# Patient Record
Sex: FEMALE | Race: WHITE | NOT HISPANIC OR LATINO | URBAN - METROPOLITAN AREA
[De-identification: names, ages, dates, MRNs, and addresses within clinical notes are randomized per-mention and may not be internally consistent; named-entity substitution may affect disease eponyms.]

---

## 2018-06-12 ENCOUNTER — INPATIENT (INPATIENT)
Facility: HOSPITAL | Age: 23
LOS: 2 days | Discharge: HOME | End: 2018-06-15
Attending: PSYCHIATRY & NEUROLOGY | Admitting: PSYCHIATRY & NEUROLOGY
Payer: COMMERCIAL

## 2018-06-12 VITALS — OXYGEN SATURATION: 98 % | TEMPERATURE: 98 F | RESPIRATION RATE: 20 BRPM | HEIGHT: 67 IN | WEIGHT: 192.02 LBS

## 2018-06-12 DIAGNOSIS — F31.60 BIPOLAR DISORDER, CURRENT EPISODE MIXED, UNSPECIFIED: ICD-10-CM

## 2018-06-12 PROCEDURE — 90792 PSYCH DIAG EVAL W/MED SRVCS: CPT

## 2018-06-12 RX ORDER — CLONAZEPAM 1 MG
0.5 TABLET ORAL
Qty: 0 | Refills: 0 | Status: DISCONTINUED | OUTPATIENT
Start: 2018-06-12 | End: 2018-06-15

## 2018-06-12 RX ORDER — LITHIUM CARBONATE 300 MG/1
300 TABLET, EXTENDED RELEASE ORAL
Qty: 0 | Refills: 0 | Status: DISCONTINUED | OUTPATIENT
Start: 2018-06-12 | End: 2018-06-13

## 2018-06-12 RX ORDER — HALOPERIDOL DECANOATE 100 MG/ML
5 INJECTION INTRAMUSCULAR ONCE
Qty: 0 | Refills: 0 | Status: COMPLETED | OUTPATIENT
Start: 2018-06-12 | End: 2018-06-12

## 2018-06-12 RX ORDER — RISPERIDONE 4 MG/1
3 TABLET ORAL AT BEDTIME
Qty: 0 | Refills: 0 | Status: DISCONTINUED | OUTPATIENT
Start: 2018-06-12 | End: 2018-06-13

## 2018-06-12 RX ORDER — LEVOTHYROXINE SODIUM 125 MCG
50 TABLET ORAL DAILY
Qty: 0 | Refills: 0 | Status: DISCONTINUED | OUTPATIENT
Start: 2018-06-12 | End: 2018-06-15

## 2018-06-12 RX ORDER — DIVALPROEX SODIUM 500 MG/1
1000 TABLET, DELAYED RELEASE ORAL AT BEDTIME
Qty: 0 | Refills: 0 | Status: DISCONTINUED | OUTPATIENT
Start: 2018-06-12 | End: 2018-06-13

## 2018-06-12 RX ADMIN — Medication 2 MILLIGRAM(S): at 22:02

## 2018-06-12 RX ADMIN — LITHIUM CARBONATE 300 MILLIGRAM(S): 300 TABLET, EXTENDED RELEASE ORAL at 21:15

## 2018-06-12 RX ADMIN — HALOPERIDOL DECANOATE 5 MILLIGRAM(S): 100 INJECTION INTRAMUSCULAR at 22:02

## 2018-06-12 RX ADMIN — DIVALPROEX SODIUM 1000 MILLIGRAM(S): 500 TABLET, DELAYED RELEASE ORAL at 21:15

## 2018-06-12 RX ADMIN — Medication 0.5 MILLIGRAM(S): at 21:15

## 2018-06-12 RX ADMIN — RISPERIDONE 3 MILLIGRAM(S): 4 TABLET ORAL at 21:15

## 2018-06-12 NOTE — CHART NOTE - NSCHARTNOTEFT_GEN_A_CORE
Screening Medical Evaluation  Patient Admitted from: [facility] admitting diagnosis: Bipolar affective disorder, current episode mixed    PAST MEDICAL & SURGICAL HISTORY:  PCOS (polycystic ovarian syndrome)  ADHD (attention deficit hyperactivity disorder)  Bipolar 1 disorder  No significant past surgical history        Allergies    No Known Allergies    Intolerances        Social History:     FAMILY HISTORY:  No pertinent family history in first degree relatives      MEDICATIONS  (STANDING):  clonazePAM Tablet 0.5 milliGRAM(s) Oral two times a day  diVALproex ER 1000 milliGRAM(s) Oral at bedtime  levothyroxine 50 MICROGram(s) Oral daily  lithium SR (LITHOBID) 300 milliGRAM(s) Oral two times a day  risperiDONE   Tablet 3 milliGRAM(s) Oral at bedtime    MEDICATIONS  (PRN):  LORazepam   Injectable 2 milliGRAM(s) IntraMuscular every 6 hours PRN Agitation  LORazepam   Injectable 2 milliGRAM(s) IntraMuscular every 6 hours PRN Agitation      Vital Signs Last 24 Hrs  T(C): 36.8 (12 Jun 2018 18:03), Max: 36.8 (12 Jun 2018 18:03)  T(F): 98.3 (12 Jun 2018 18:03), Max: 98.3 (12 Jun 2018 18:03)  HR: 103 (12 Jun 2018 18:03)  BP: 134/80 (12 Jun 2018 18:03)  RR: 20 (12 Jun 2018 18:03) (20 - 20)  SpO2: 98% (12 Jun 2018 18:03) (98% - 98%)  CAPILLARY BLOOD GLUCOSE            PHYSICAL EXAM:  GENERAL: NAD, well-developed  HEAD:  Atraumatic, Normocephalic  EYES: EOMI, PERRLA, conjunctiva and sclera clear  NECK: Supple.  CHEST/LUNG: Clear to auscultation bilaterally; No wheezes noted.  HEART: Regular rate and rhythm; +s1 and +s2; No murmurs, rubs, or gallops  ABDOMEN: Soft, Nontender, Nondistended; Normoactive Bowel sounds present  EXTREMITIES:  2+ Peripheral Pulses, No cyanosis, or edema  PSYCH: AAOx3  NEUROLOGY: non-focal  SKIN: No rashes or lesions    LABS:                    RADIOLOGY & ADDITIONAL TESTS:    Assessment and Plan:  23 year old female presenting today from [facility] to [facility] with admitting diagnosis of Bipolar affective disorder, current episode mixed with PMH of PCOS, and hypothyroidism. Pt admitted due to ketamine induced psychosis with what is described as rapid cycling as per chart. Denies any medical concerns at this time. Denies any fever, chills, headache, SOB, chest pain, abdominal pain, N/V/D/C.  1)	Bipolar affective disorder, current episode mixed: follow care plan as per primary psych team.  2)	Hypthyroidism: Continue Synthroid 50 mcg oral daily.

## 2018-06-13 LAB
ALBUMIN SERPL ELPH-MCNC: 3.9 G/DL — SIGNIFICANT CHANGE UP (ref 3.3–5)
ALP SERPL-CCNC: 99 U/L — SIGNIFICANT CHANGE UP (ref 40–120)
ALT FLD-CCNC: 31 U/L — SIGNIFICANT CHANGE UP (ref 4–33)
AMPHET UR-MCNC: NEGATIVE — SIGNIFICANT CHANGE UP
APPEARANCE UR: CLEAR — SIGNIFICANT CHANGE UP
AST SERPL-CCNC: 25 U/L — SIGNIFICANT CHANGE UP (ref 4–32)
BACTERIA # UR AUTO: SIGNIFICANT CHANGE UP
BARBITURATES UR SCN-MCNC: NEGATIVE — SIGNIFICANT CHANGE UP
BASOPHILS # BLD AUTO: 0.03 K/UL — SIGNIFICANT CHANGE UP (ref 0–0.2)
BASOPHILS NFR BLD AUTO: 0.5 % — SIGNIFICANT CHANGE UP (ref 0–2)
BENZODIAZ UR-MCNC: NEGATIVE — SIGNIFICANT CHANGE UP
BILIRUB SERPL-MCNC: 0.2 MG/DL — SIGNIFICANT CHANGE UP (ref 0.2–1.2)
BILIRUB UR-MCNC: NEGATIVE — SIGNIFICANT CHANGE UP
BLOOD UR QL VISUAL: NEGATIVE — SIGNIFICANT CHANGE UP
BUN SERPL-MCNC: 13 MG/DL — SIGNIFICANT CHANGE UP (ref 7–23)
CALCIUM SERPL-MCNC: 9.2 MG/DL — SIGNIFICANT CHANGE UP (ref 8.4–10.5)
CANNABINOIDS UR-MCNC: NEGATIVE — SIGNIFICANT CHANGE UP
CHLORIDE SERPL-SCNC: 103 MMOL/L — SIGNIFICANT CHANGE UP (ref 98–107)
CHOLEST SERPL-MCNC: 199 MG/DL — SIGNIFICANT CHANGE UP (ref 120–199)
CO2 SERPL-SCNC: 25 MMOL/L — SIGNIFICANT CHANGE UP (ref 22–31)
COCAINE METAB.OTHER UR-MCNC: NEGATIVE — SIGNIFICANT CHANGE UP
COLOR SPEC: SIGNIFICANT CHANGE UP
CREAT SERPL-MCNC: 0.97 MG/DL — SIGNIFICANT CHANGE UP (ref 0.5–1.3)
EOSINOPHIL # BLD AUTO: 0.18 K/UL — SIGNIFICANT CHANGE UP (ref 0–0.5)
EOSINOPHIL NFR BLD AUTO: 2.9 % — SIGNIFICANT CHANGE UP (ref 0–6)
GLUCOSE SERPL-MCNC: 82 MG/DL — SIGNIFICANT CHANGE UP (ref 70–99)
GLUCOSE UR-MCNC: NEGATIVE — SIGNIFICANT CHANGE UP
HBA1C BLD-MCNC: 4.8 % — SIGNIFICANT CHANGE UP (ref 4–5.6)
HCG UR-SCNC: NEGATIVE — SIGNIFICANT CHANGE UP
HCT VFR BLD CALC: 38.5 % — SIGNIFICANT CHANGE UP (ref 34.5–45)
HDLC SERPL-MCNC: 50 MG/DL — SIGNIFICANT CHANGE UP (ref 45–65)
HGB BLD-MCNC: 12.4 G/DL — SIGNIFICANT CHANGE UP (ref 11.5–15.5)
HYALINE CASTS # UR AUTO: SIGNIFICANT CHANGE UP (ref 0–?)
IMM GRANULOCYTES # BLD AUTO: 0.03 # — SIGNIFICANT CHANGE UP
IMM GRANULOCYTES NFR BLD AUTO: 0.5 % — SIGNIFICANT CHANGE UP (ref 0–1.5)
KETONES UR-MCNC: NEGATIVE — SIGNIFICANT CHANGE UP
LEUKOCYTE ESTERASE UR-ACNC: NEGATIVE — SIGNIFICANT CHANGE UP
LIPID PNL WITH DIRECT LDL SERPL: 122 MG/DL — SIGNIFICANT CHANGE UP
LITHIUM SERPL-MCNC: 0.81 MMOL/L — SIGNIFICANT CHANGE UP (ref 0.6–1.2)
LYMPHOCYTES # BLD AUTO: 2.26 K/UL — SIGNIFICANT CHANGE UP (ref 1–3.3)
LYMPHOCYTES # BLD AUTO: 36.9 % — SIGNIFICANT CHANGE UP (ref 13–44)
MCHC RBC-ENTMCNC: 30.2 PG — SIGNIFICANT CHANGE UP (ref 27–34)
MCHC RBC-ENTMCNC: 32.2 % — SIGNIFICANT CHANGE UP (ref 32–36)
MCV RBC AUTO: 93.7 FL — SIGNIFICANT CHANGE UP (ref 80–100)
METHADONE UR-MCNC: NEGATIVE — SIGNIFICANT CHANGE UP
MONOCYTES # BLD AUTO: 0.38 K/UL — SIGNIFICANT CHANGE UP (ref 0–0.9)
MONOCYTES NFR BLD AUTO: 6.2 % — SIGNIFICANT CHANGE UP (ref 2–14)
NEUTROPHILS # BLD AUTO: 3.24 K/UL — SIGNIFICANT CHANGE UP (ref 1.8–7.4)
NEUTROPHILS NFR BLD AUTO: 53 % — SIGNIFICANT CHANGE UP (ref 43–77)
NITRITE UR-MCNC: NEGATIVE — SIGNIFICANT CHANGE UP
NRBC # FLD: 0 — SIGNIFICANT CHANGE UP
OPIATES UR-MCNC: NEGATIVE — SIGNIFICANT CHANGE UP
OXYCODONE UR-MCNC: NEGATIVE — SIGNIFICANT CHANGE UP
PCP UR-MCNC: NEGATIVE — SIGNIFICANT CHANGE UP
PH UR: 7 — SIGNIFICANT CHANGE UP (ref 4.6–8)
PLATELET # BLD AUTO: 228 K/UL — SIGNIFICANT CHANGE UP (ref 150–400)
PMV BLD: 10.8 FL — SIGNIFICANT CHANGE UP (ref 7–13)
POTASSIUM SERPL-MCNC: 5.1 MMOL/L — SIGNIFICANT CHANGE UP (ref 3.5–5.3)
POTASSIUM SERPL-SCNC: 5.1 MMOL/L — SIGNIFICANT CHANGE UP (ref 3.5–5.3)
PROT SERPL-MCNC: 6.3 G/DL — SIGNIFICANT CHANGE UP (ref 6–8.3)
PROT UR-MCNC: NEGATIVE MG/DL — SIGNIFICANT CHANGE UP
RBC # BLD: 4.11 M/UL — SIGNIFICANT CHANGE UP (ref 3.8–5.2)
RBC # FLD: 11.5 % — SIGNIFICANT CHANGE UP (ref 10.3–14.5)
RBC CASTS # UR COMP ASSIST: SIGNIFICANT CHANGE UP (ref 0–?)
SODIUM SERPL-SCNC: 141 MMOL/L — SIGNIFICANT CHANGE UP (ref 135–145)
SP GR SPEC: 1 — LOW (ref 1–1.04)
SP GR UR: 1 — LOW (ref 1–1.03)
SQUAMOUS # UR AUTO: SIGNIFICANT CHANGE UP
TRIGL SERPL-MCNC: 213 MG/DL — HIGH (ref 10–149)
TSH SERPL-MCNC: 6.03 UIU/ML — HIGH (ref 0.27–4.2)
UROBILINOGEN FLD QL: NORMAL MG/DL — SIGNIFICANT CHANGE UP
VALPROATE SERPL-MCNC: 41.1 UG/ML — LOW (ref 50–100)
WBC # BLD: 6.12 K/UL — SIGNIFICANT CHANGE UP (ref 3.8–10.5)
WBC # FLD AUTO: 6.12 K/UL — SIGNIFICANT CHANGE UP (ref 3.8–10.5)
WBC UR QL: SIGNIFICANT CHANGE UP (ref 0–?)

## 2018-06-13 PROCEDURE — 99233 SBSQ HOSP IP/OBS HIGH 50: CPT

## 2018-06-13 RX ORDER — DIPHENHYDRAMINE HCL 50 MG
50 CAPSULE ORAL EVERY 4 HOURS
Qty: 0 | Refills: 0 | Status: DISCONTINUED | OUTPATIENT
Start: 2018-06-13 | End: 2018-06-15

## 2018-06-13 RX ORDER — HALOPERIDOL DECANOATE 100 MG/ML
5 INJECTION INTRAMUSCULAR EVERY 4 HOURS
Qty: 0 | Refills: 0 | Status: DISCONTINUED | OUTPATIENT
Start: 2018-06-13 | End: 2018-06-15

## 2018-06-13 RX ORDER — RISPERIDONE 4 MG/1
4 TABLET ORAL AT BEDTIME
Qty: 0 | Refills: 0 | Status: DISCONTINUED | OUTPATIENT
Start: 2018-06-13 | End: 2018-06-15

## 2018-06-13 RX ORDER — DIPHENHYDRAMINE HCL 50 MG
50 CAPSULE ORAL ONCE
Qty: 0 | Refills: 0 | Status: DISCONTINUED | OUTPATIENT
Start: 2018-06-13 | End: 2018-06-15

## 2018-06-13 RX ORDER — HALOPERIDOL DECANOATE 100 MG/ML
5 INJECTION INTRAMUSCULAR ONCE
Qty: 0 | Refills: 0 | Status: DISCONTINUED | OUTPATIENT
Start: 2018-06-13 | End: 2018-06-15

## 2018-06-13 RX ADMIN — LITHIUM CARBONATE 300 MILLIGRAM(S): 300 TABLET, EXTENDED RELEASE ORAL at 09:30

## 2018-06-13 RX ADMIN — Medication 0.5 MILLIGRAM(S): at 21:00

## 2018-06-13 RX ADMIN — HALOPERIDOL DECANOATE 5 MILLIGRAM(S): 100 INJECTION INTRAMUSCULAR at 16:39

## 2018-06-13 RX ADMIN — Medication 50 MICROGRAM(S): at 09:30

## 2018-06-13 RX ADMIN — RISPERIDONE 4 MILLIGRAM(S): 4 TABLET ORAL at 21:00

## 2018-06-13 RX ADMIN — Medication 2 MILLIGRAM(S): at 16:39

## 2018-06-13 RX ADMIN — Medication 0.5 MILLIGRAM(S): at 09:24

## 2018-06-13 RX ADMIN — Medication 50 MILLIGRAM(S): at 16:39

## 2018-06-14 PROCEDURE — 90853 GROUP PSYCHOTHERAPY: CPT

## 2018-06-14 PROCEDURE — 90834 PSYTX W PT 45 MINUTES: CPT | Mod: 59

## 2018-06-14 PROCEDURE — 99232 SBSQ HOSP IP/OBS MODERATE 35: CPT | Mod: 25

## 2018-06-14 RX ORDER — LITHIUM CARBONATE 300 MG/1
300 TABLET, EXTENDED RELEASE ORAL
Qty: 0 | Refills: 0 | Status: DISCONTINUED | OUTPATIENT
Start: 2018-06-14 | End: 2018-06-14

## 2018-06-14 RX ORDER — CLONAZEPAM 1 MG
0.5 TABLET ORAL
Qty: 0 | Refills: 0 | Status: CANCELLED | OUTPATIENT
Start: 2018-06-20 | End: 2018-06-15

## 2018-06-14 RX ORDER — DIVALPROEX SODIUM 500 MG/1
1000 TABLET, DELAYED RELEASE ORAL AT BEDTIME
Qty: 0 | Refills: 0 | Status: DISCONTINUED | OUTPATIENT
Start: 2018-06-14 | End: 2018-06-14

## 2018-06-14 RX ADMIN — Medication 2 MILLIGRAM(S): at 10:50

## 2018-06-14 RX ADMIN — LITHIUM CARBONATE 300 MILLIGRAM(S): 300 TABLET, EXTENDED RELEASE ORAL at 09:22

## 2018-06-14 RX ADMIN — Medication 0.5 MILLIGRAM(S): at 20:32

## 2018-06-14 RX ADMIN — Medication 2 MILLIGRAM(S): at 14:31

## 2018-06-14 RX ADMIN — Medication 0.5 MILLIGRAM(S): at 09:22

## 2018-06-14 RX ADMIN — Medication 50 MICROGRAM(S): at 09:22

## 2018-06-14 RX ADMIN — RISPERIDONE 4 MILLIGRAM(S): 4 TABLET ORAL at 20:32

## 2018-06-15 VITALS — DIASTOLIC BLOOD PRESSURE: 59 MMHG | HEART RATE: 90 BPM | SYSTOLIC BLOOD PRESSURE: 115 MMHG | TEMPERATURE: 98 F

## 2018-06-15 PROCEDURE — 90834 PSYTX W PT 45 MINUTES: CPT

## 2018-06-15 PROCEDURE — 99238 HOSP IP/OBS DSCHRG MGMT 30/<: CPT

## 2018-06-15 RX ADMIN — Medication 0.5 MILLIGRAM(S): at 09:04

## 2018-06-15 RX ADMIN — Medication 50 MICROGRAM(S): at 09:04

## 2018-06-20 ENCOUNTER — EMERGENCY (EMERGENCY)
Facility: HOSPITAL | Age: 23
LOS: 1 days | Discharge: HOME | End: 2018-06-20
Admitting: EMERGENCY MEDICINE
Payer: COMMERCIAL

## 2018-06-20 VITALS
TEMPERATURE: 98 F | SYSTOLIC BLOOD PRESSURE: 144 MMHG | RESPIRATION RATE: 18 BRPM | HEART RATE: 81 BPM | OXYGEN SATURATION: 100 % | DIASTOLIC BLOOD PRESSURE: 82 MMHG

## 2018-06-20 VITALS
HEART RATE: 98 BPM | SYSTOLIC BLOOD PRESSURE: 135 MMHG | OXYGEN SATURATION: 100 % | DIASTOLIC BLOOD PRESSURE: 90 MMHG | TEMPERATURE: 98 F | RESPIRATION RATE: 18 BRPM

## 2018-06-20 DIAGNOSIS — F43.24 ADJUSTMENT DISORDER WITH DISTURBANCE OF CONDUCT: ICD-10-CM

## 2018-06-20 DIAGNOSIS — F60.3 BORDERLINE PERSONALITY DISORDER: ICD-10-CM

## 2018-06-20 LAB
ALBUMIN SERPL ELPH-MCNC: 4.3 G/DL — SIGNIFICANT CHANGE UP (ref 3.3–5)
ALP SERPL-CCNC: 131 U/L — HIGH (ref 40–120)
ALT FLD-CCNC: 31 U/L — SIGNIFICANT CHANGE UP (ref 4–33)
AMPHET UR-MCNC: NEGATIVE — SIGNIFICANT CHANGE UP
APAP SERPL-MCNC: < 15 UG/ML — LOW (ref 15–25)
APPEARANCE UR: CLEAR — SIGNIFICANT CHANGE UP
AST SERPL-CCNC: 21 U/L — SIGNIFICANT CHANGE UP (ref 4–32)
BARBITURATES UR SCN-MCNC: NEGATIVE — SIGNIFICANT CHANGE UP
BASOPHILS # BLD AUTO: 0.02 K/UL — SIGNIFICANT CHANGE UP (ref 0–0.2)
BASOPHILS NFR BLD AUTO: 0.3 % — SIGNIFICANT CHANGE UP (ref 0–2)
BENZODIAZ UR-MCNC: NEGATIVE — SIGNIFICANT CHANGE UP
BILIRUB SERPL-MCNC: < 0.2 MG/DL — LOW (ref 0.2–1.2)
BILIRUB UR-MCNC: NEGATIVE — SIGNIFICANT CHANGE UP
BLOOD UR QL VISUAL: NEGATIVE — SIGNIFICANT CHANGE UP
BUN SERPL-MCNC: 14 MG/DL — SIGNIFICANT CHANGE UP (ref 7–23)
CALCIUM SERPL-MCNC: 9.6 MG/DL — SIGNIFICANT CHANGE UP (ref 8.4–10.5)
CANNABINOIDS UR-MCNC: NEGATIVE — SIGNIFICANT CHANGE UP
CHLORIDE SERPL-SCNC: 102 MMOL/L — SIGNIFICANT CHANGE UP (ref 98–107)
CO2 SERPL-SCNC: 28 MMOL/L — SIGNIFICANT CHANGE UP (ref 22–31)
COCAINE METAB.OTHER UR-MCNC: NEGATIVE — SIGNIFICANT CHANGE UP
COLOR SPEC: SIGNIFICANT CHANGE UP
CREAT SERPL-MCNC: 0.91 MG/DL — SIGNIFICANT CHANGE UP (ref 0.5–1.3)
EOSINOPHIL # BLD AUTO: 0.08 K/UL — SIGNIFICANT CHANGE UP (ref 0–0.5)
EOSINOPHIL NFR BLD AUTO: 1.2 % — SIGNIFICANT CHANGE UP (ref 0–6)
ETHANOL BLD-MCNC: < 10 MG/DL — SIGNIFICANT CHANGE UP
GLUCOSE SERPL-MCNC: 117 MG/DL — HIGH (ref 70–99)
GLUCOSE UR-MCNC: NEGATIVE — SIGNIFICANT CHANGE UP
HCT VFR BLD CALC: 38.1 % — SIGNIFICANT CHANGE UP (ref 34.5–45)
HGB BLD-MCNC: 13.1 G/DL — SIGNIFICANT CHANGE UP (ref 11.5–15.5)
IMM GRANULOCYTES # BLD AUTO: 0.03 # — SIGNIFICANT CHANGE UP
IMM GRANULOCYTES NFR BLD AUTO: 0.4 % — SIGNIFICANT CHANGE UP (ref 0–1.5)
KETONES UR-MCNC: NEGATIVE — SIGNIFICANT CHANGE UP
LEUKOCYTE ESTERASE UR-ACNC: NEGATIVE — SIGNIFICANT CHANGE UP
LYMPHOCYTES # BLD AUTO: 2.61 K/UL — SIGNIFICANT CHANGE UP (ref 1–3.3)
LYMPHOCYTES # BLD AUTO: 38.4 % — SIGNIFICANT CHANGE UP (ref 13–44)
MCHC RBC-ENTMCNC: 29.7 PG — SIGNIFICANT CHANGE UP (ref 27–34)
MCHC RBC-ENTMCNC: 34.4 % — SIGNIFICANT CHANGE UP (ref 32–36)
MCV RBC AUTO: 86.4 FL — SIGNIFICANT CHANGE UP (ref 80–100)
METHADONE UR-MCNC: NEGATIVE — SIGNIFICANT CHANGE UP
MONOCYTES # BLD AUTO: 0.39 K/UL — SIGNIFICANT CHANGE UP (ref 0–0.9)
MONOCYTES NFR BLD AUTO: 5.7 % — SIGNIFICANT CHANGE UP (ref 2–14)
MUCOUS THREADS # UR AUTO: SIGNIFICANT CHANGE UP
NEUTROPHILS # BLD AUTO: 3.66 K/UL — SIGNIFICANT CHANGE UP (ref 1.8–7.4)
NEUTROPHILS NFR BLD AUTO: 54 % — SIGNIFICANT CHANGE UP (ref 43–77)
NITRITE UR-MCNC: NEGATIVE — SIGNIFICANT CHANGE UP
NRBC # FLD: 0 — SIGNIFICANT CHANGE UP
OPIATES UR-MCNC: NEGATIVE — SIGNIFICANT CHANGE UP
OXYCODONE UR-MCNC: NEGATIVE — SIGNIFICANT CHANGE UP
PCP UR-MCNC: NEGATIVE — SIGNIFICANT CHANGE UP
PH UR: 6 — SIGNIFICANT CHANGE UP (ref 4.6–8)
PLATELET # BLD AUTO: 273 K/UL — SIGNIFICANT CHANGE UP (ref 150–400)
PMV BLD: 10.3 FL — SIGNIFICANT CHANGE UP (ref 7–13)
POTASSIUM SERPL-MCNC: 4 MMOL/L — SIGNIFICANT CHANGE UP (ref 3.5–5.3)
POTASSIUM SERPL-SCNC: 4 MMOL/L — SIGNIFICANT CHANGE UP (ref 3.5–5.3)
PROT SERPL-MCNC: 7.3 G/DL — SIGNIFICANT CHANGE UP (ref 6–8.3)
PROT UR-MCNC: NEGATIVE MG/DL — SIGNIFICANT CHANGE UP
RBC # BLD: 4.41 M/UL — SIGNIFICANT CHANGE UP (ref 3.8–5.2)
RBC # FLD: 11.3 % — SIGNIFICANT CHANGE UP (ref 10.3–14.5)
RBC CASTS # UR COMP ASSIST: SIGNIFICANT CHANGE UP (ref 0–?)
SALICYLATES SERPL-MCNC: < 5 MG/DL — LOW (ref 15–30)
SODIUM SERPL-SCNC: 140 MMOL/L — SIGNIFICANT CHANGE UP (ref 135–145)
SP GR SPEC: 1.01 — SIGNIFICANT CHANGE UP (ref 1–1.04)
SQUAMOUS # UR AUTO: SIGNIFICANT CHANGE UP
TSH SERPL-MCNC: 2.07 UIU/ML — SIGNIFICANT CHANGE UP (ref 0.27–4.2)
UROBILINOGEN FLD QL: NORMAL MG/DL — SIGNIFICANT CHANGE UP
WBC # BLD: 6.79 K/UL — SIGNIFICANT CHANGE UP (ref 3.8–10.5)
WBC # FLD AUTO: 6.79 K/UL — SIGNIFICANT CHANGE UP (ref 3.8–10.5)
WBC UR QL: SIGNIFICANT CHANGE UP (ref 0–?)

## 2018-06-20 PROCEDURE — 93010 ELECTROCARDIOGRAM REPORT: CPT

## 2018-06-20 PROCEDURE — 90792 PSYCH DIAG EVAL W/MED SRVCS: CPT

## 2018-06-20 PROCEDURE — 99285 EMERGENCY DEPT VISIT HI MDM: CPT | Mod: 25

## 2018-06-20 NOTE — ED BEHAVIORAL HEALTH ASSESSMENT NOTE - RISK ASSESSMENT
moderate- see above  Patient is in active mood episode, has hx of suicide attempts, long severe mental illness since age 6, family hx of completed suicide, impulsivity, issues with primary support, unemployed, dropped out of college Risk factors include recent inpt hospitalization, prior suicide attempts, impulsivity, interpersonal instability, and recent medication changes. Protective factors includes no current active SI/HI/I/P, positive outpatient therapeutic relationships, abstinence from substances, no access to guns, no legal problems, compliance with medications, future oriented (states she needs to be alive for her dog), able to engage in safety planning, and denies global insomnia. At this time pt is at low imminent risk of harm but at elevated chronic risk. Chronic risk factors cannot be modified by an acute inpt hospitalization and better addressed in an intensive and long term outpatient setting.

## 2018-06-20 NOTE — ED BEHAVIORAL HEALTH ASSESSMENT NOTE - AXIS IV
Problems with primary support/Housing problems Problem related to social environment/Housing problems/Problems with primary support

## 2018-06-20 NOTE — ED BEHAVIORAL HEALTH ASSESSMENT NOTE - ACTIVATING EVENTS/STRESSORS
Pending incarceration or homelessness Pending incarceration or homelessness/Recent change in treartment
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
## 2018-06-20 NOTE — ED BEHAVIORAL HEALTH ASSESSMENT NOTE - DESCRIPTION (FIRST USE, LAST USE, QUANTITY, FREQUENCY, DURATION)
Last use was a beer 2 weeks ago, but reports remote hx of abusing alcohol and Alprazolam when she was 20yo. Denies hx of withdrawal sx including withdrawal seizures. Reports remote hx of abusing alcohol and Alprazolam when she was 20yo. Denies hx of withdrawal sx including withdrawal seizures. Denies current alcohol or drug use. see above

## 2018-06-20 NOTE — ED BEHAVIORAL HEALTH ASSESSMENT NOTE - PRIMARY DX
Bipolar disorder, current episode depressed, severe, without psychotic features Adjustment disorder with disturbance of conduct Borderline personality disorder

## 2018-06-20 NOTE — ED ADULT NURSE NOTE - CHIEF COMPLAINT QUOTE
pt co feeling depressed and suicidal. pt was d/cd from [facility] this past Friday. Pt with bipolar 1 disorder. Pt states was suppose to have ECt but left [facility] before treatment .

## 2018-06-20 NOTE — ED BEHAVIORAL HEALTH ASSESSMENT NOTE - SAFETY PLAN DETAILS
Call 911 or return to the ED with worsening symptoms, SI/HI, urges for SIB, aggressive I/I/P, or any other imminent safety concerns.

## 2018-06-20 NOTE — ED BEHAVIORAL HEALTH ASSESSMENT NOTE - CURRENT MEDICATION
[id number] in the morning and 900mg at bedtime, Abilify 5mg, Risperidal 3mg, Levothyroxine 25mcg, Alprazolam 0.5mg TID PRON, birth control Risperdal 4mg QHS, Klonopin 0.5mg BID, synthroid 50 mcg, OCP
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
## 2018-06-20 NOTE — ED BEHAVIORAL HEALTH ASSESSMENT NOTE - SUICIDE PROTECTIVE FACTORS
Positive therapeutic relationships/Supportive social network or family Identifies reasons for living/Future oriented/Positive therapeutic relationships
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
## 2018-06-20 NOTE — ED PROVIDER NOTE - MEDICAL DECISION MAKING DETAILS
This is a 23 year old Female PMHX ADHD PCOS Bipolar came in today for eval r/t admission to [facility]. Patient reports discharge from [facility] on Friday and not having ECT therapy. Since discharge she is experiencing increased depression, suicidal thoughts  and states " I just want to die" Medical evaluation performed. There is no clinical evidence of intoxication or any acute medical problem requiring immediate intervention. Final disposition will be determined by psychiatrist.

## 2018-06-20 NOTE — ED ADULT TRIAGE NOTE - CHIEF COMPLAINT QUOTE
pt co feeling depressed and suicidal. pt was d/cd from [facility] this past Friday. Pt with bipolar 1 disorder. Pt states was suppose to have ECt but left [facility] before treatment .

## 2018-06-20 NOTE — ED BEHAVIORAL HEALTH ASSESSMENT NOTE - CASE SUMMARY
22 y/o female with documented history of Borderline Personality Disorder, presents today following an argument with her mother and being told she does not have a home to return to.   Based on history and exam, pt demonstrates prominent features of borderline pathology (affective instability, impulsivity, easily angered, chronic passive SI, chaotic interpersonal relationships). Pt is not manic or psychotic, and she does not meet criteria for a major depressive episode. Currently, pt adamantly denies thoughts of self-harm, and she denies suicidal ideation, intent, or plan. She denies homicidal or violent ideation, intent, or plan. She does not present an acute danger to self or others and does not meet criteria for involuntary psychiatric admission at this time. She declines voluntary psychiatric admission.

## 2018-06-20 NOTE — ED PROVIDER NOTE - PMH
ADHD (attention deficit hyperactivity disorder)    Bipolar 1 disorder    PCOS (polycystic ovarian syndrome)

## 2018-06-20 NOTE — ED ADULT NURSE NOTE - OBJECTIVE STATEMENT
Patient received in  c/o feeling depressed and suicidal, pt denies plan/intent. pt endorsed recent d/c from [facility] and new onset of depression, endorsed plan to be started on ECT. pt denies HI&AH. Denies ETOH and substance use. psych eval ongoing

## 2018-06-20 NOTE — ED PROVIDER NOTE - OBJECTIVE STATEMENT
This is a 23 year old Female came in today for eval r/t admission to [facility]. Patient reports discharge from [facility] on Friday and not having ECT therapy. Since discharge she is experiencing increased depression, suicidal thoughts  and states " I just want to die" Reports on Risperdal, levothyroxine Klonopin. This is a 23 year old Female PMHX ADHD PCOS Bipolar came in today for eval r/t admission to [facility]. Patient reports discharge from [facility] on Friday and not having ECT therapy. Since discharge she is experiencing increased depression, suicidal thoughts  and states " I just want to die" Reports on Risperdal, levothyroxine Klonopin. Patient requesting voluntary admission. Denies HI Denies AH/VH Denies ETOH/Illicit drugs

## 2018-06-20 NOTE — ED BEHAVIORAL HEALTH ASSESSMENT NOTE - OTHER PAST PSYCHIATRIC HISTORY (INCLUDE DETAILS REGARDING ONSET, COURSE OF ILLNESS, INPATIENT/OUTPATIENT TREATMENT)
She reported history of bipolar disorder (rapid cylcing), ADHD and depression with SI since 5yo, multiple psych hospitalizations (last one was a few years ago), two suicide attempts by OD on Lithium when she was 19yo and by suffocation at age 17, hx of cutting, and she is currently being followed by Dr. Baeza (psychiatrist for 6 years) and Laya Vyas (weekly therapist for the past few weeks). See HPI. Previously reported history of bipolar disorder (rapid cylcing), ADHD and depression with SI since 5yo, multiple psych hospitalizations (last one was a few years ago), two suicide attempts by OD on Lithium when she was 17yo and by suffocation at age 17, hx of cutting, and she is currently being followed by Dr. Baeza (psychiatrist for 6 years) and Laya Vyas (weekly therapist for the past few weeks).

## 2018-06-20 NOTE — ED BEHAVIORAL HEALTH ASSESSMENT NOTE - REFERRAL / APPOINTMENT DETAILS
Continue outpatient treatment. Patient is pre-registered for ECT in the system. Continue outpatient treatment. Patient is pre-registered for ECT in the system. Attempt made to contact Dr. Hoyos to reschedule appt however the office is closed and voicemail was received. Message was left.

## 2018-06-20 NOTE — ED PROVIDER NOTE - CARE PLAN
Principal Discharge DX:	Adjustment disorder with disturbance of conduct  Secondary Diagnosis:	Borderline personality disorder
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
## 2018-06-26 ENCOUNTER — OUTPATIENT (OUTPATIENT)
Dept: OUTPATIENT SERVICES | Facility: HOSPITAL | Age: 23
LOS: 1 days | Discharge: HOME | End: 2018-06-26
Payer: COMMERCIAL

## 2018-07-03 PROCEDURE — 90870 ELECTROCONVULSIVE THERAPY: CPT

## 2018-07-03 RX ORDER — MIDAZOLAM HYDROCHLORIDE 1 MG/ML
2 INJECTION, SOLUTION INTRAMUSCULAR; INTRAVENOUS ONCE
Qty: 0 | Refills: 0 | Status: DISCONTINUED | OUTPATIENT
Start: 2018-07-03 | End: 2018-07-03

## 2018-07-03 RX ADMIN — MIDAZOLAM HYDROCHLORIDE 2 MILLIGRAM(S): 1 INJECTION, SOLUTION INTRAMUSCULAR; INTRAVENOUS at 14:05

## 2018-07-05 PROCEDURE — 90870 ELECTROCONVULSIVE THERAPY: CPT

## 2018-07-05 RX ORDER — MIDAZOLAM HYDROCHLORIDE 1 MG/ML
2 INJECTION, SOLUTION INTRAMUSCULAR; INTRAVENOUS ONCE
Qty: 0 | Refills: 0 | Status: DISCONTINUED | OUTPATIENT
Start: 2018-07-05 | End: 2018-07-05

## 2018-07-05 RX ADMIN — MIDAZOLAM HYDROCHLORIDE 2 MILLIGRAM(S): 1 INJECTION, SOLUTION INTRAMUSCULAR; INTRAVENOUS at 15:25

## 2018-07-10 PROCEDURE — 90870 ELECTROCONVULSIVE THERAPY: CPT

## 2018-07-10 RX ORDER — MIDAZOLAM HYDROCHLORIDE 1 MG/ML
2 INJECTION, SOLUTION INTRAMUSCULAR; INTRAVENOUS ONCE
Qty: 0 | Refills: 0 | Status: DISCONTINUED | OUTPATIENT
Start: 2018-07-10 | End: 2018-07-10

## 2018-07-10 RX ADMIN — MIDAZOLAM HYDROCHLORIDE 2 MILLIGRAM(S): 1 INJECTION, SOLUTION INTRAMUSCULAR; INTRAVENOUS at 13:53

## 2018-07-11 DIAGNOSIS — F32.9 MAJOR DEPRESSIVE DISORDER, SINGLE EPISODE, UNSPECIFIED: ICD-10-CM

## 2018-07-13 PROCEDURE — 90870 ELECTROCONVULSIVE THERAPY: CPT

## 2018-07-13 RX ORDER — MIDAZOLAM HYDROCHLORIDE 1 MG/ML
2 INJECTION, SOLUTION INTRAMUSCULAR; INTRAVENOUS ONCE
Qty: 0 | Refills: 0 | Status: DISCONTINUED | OUTPATIENT
Start: 2018-07-13 | End: 2018-07-13

## 2018-07-13 RX ADMIN — MIDAZOLAM HYDROCHLORIDE 2 MILLIGRAM(S): 1 INJECTION, SOLUTION INTRAMUSCULAR; INTRAVENOUS at 16:13

## 2018-07-17 PROCEDURE — 90870 ELECTROCONVULSIVE THERAPY: CPT

## 2018-07-17 RX ORDER — MIDAZOLAM HYDROCHLORIDE 1 MG/ML
2 INJECTION, SOLUTION INTRAMUSCULAR; INTRAVENOUS ONCE
Qty: 0 | Refills: 0 | Status: DISCONTINUED | OUTPATIENT
Start: 2018-07-17 | End: 2018-07-17

## 2018-07-17 RX ADMIN — MIDAZOLAM HYDROCHLORIDE 2 MILLIGRAM(S): 1 INJECTION, SOLUTION INTRAMUSCULAR; INTRAVENOUS at 14:00

## 2018-07-20 PROCEDURE — 90870 ELECTROCONVULSIVE THERAPY: CPT

## 2018-07-20 RX ORDER — MIDAZOLAM HYDROCHLORIDE 1 MG/ML
2 INJECTION, SOLUTION INTRAMUSCULAR; INTRAVENOUS ONCE
Qty: 0 | Refills: 0 | Status: DISCONTINUED | OUTPATIENT
Start: 2018-07-20 | End: 2018-07-20

## 2018-07-20 RX ADMIN — MIDAZOLAM HYDROCHLORIDE 2 MILLIGRAM(S): 1 INJECTION, SOLUTION INTRAMUSCULAR; INTRAVENOUS at 10:00

## 2018-07-24 PROCEDURE — 90870 ELECTROCONVULSIVE THERAPY: CPT

## 2018-07-24 RX ORDER — MIDAZOLAM HYDROCHLORIDE 1 MG/ML
2 INJECTION, SOLUTION INTRAMUSCULAR; INTRAVENOUS ONCE
Qty: 0 | Refills: 0 | Status: DISCONTINUED | OUTPATIENT
Start: 2018-07-24 | End: 2018-07-24

## 2018-07-24 RX ADMIN — MIDAZOLAM HYDROCHLORIDE 2 MILLIGRAM(S): 1 INJECTION, SOLUTION INTRAMUSCULAR; INTRAVENOUS at 14:12

## 2018-07-26 PROCEDURE — 90870 ELECTROCONVULSIVE THERAPY: CPT

## 2018-07-26 RX ORDER — MIDAZOLAM HYDROCHLORIDE 1 MG/ML
2 INJECTION, SOLUTION INTRAMUSCULAR; INTRAVENOUS ONCE
Qty: 0 | Refills: 0 | Status: DISCONTINUED | OUTPATIENT
Start: 2018-07-26 | End: 2018-07-26

## 2018-07-26 RX ADMIN — MIDAZOLAM HYDROCHLORIDE 2 MILLIGRAM(S): 1 INJECTION, SOLUTION INTRAMUSCULAR; INTRAVENOUS at 10:38

## 2018-07-31 PROCEDURE — 90870 ELECTROCONVULSIVE THERAPY: CPT

## 2018-07-31 RX ORDER — MIDAZOLAM HYDROCHLORIDE 1 MG/ML
2 INJECTION, SOLUTION INTRAMUSCULAR; INTRAVENOUS ONCE
Qty: 0 | Refills: 0 | Status: DISCONTINUED | OUTPATIENT
Start: 2018-07-31 | End: 2018-07-31

## 2018-07-31 RX ADMIN — MIDAZOLAM HYDROCHLORIDE 2 MILLIGRAM(S): 1 INJECTION, SOLUTION INTRAMUSCULAR; INTRAVENOUS at 11:52

## 2018-08-03 PROCEDURE — 90870 ELECTROCONVULSIVE THERAPY: CPT

## 2018-08-03 RX ORDER — MIDAZOLAM HYDROCHLORIDE 1 MG/ML
2 INJECTION, SOLUTION INTRAMUSCULAR; INTRAVENOUS ONCE
Qty: 0 | Refills: 0 | Status: DISCONTINUED | OUTPATIENT
Start: 2018-08-03 | End: 2018-08-03

## 2018-08-03 RX ADMIN — MIDAZOLAM HYDROCHLORIDE 2 MILLIGRAM(S): 1 INJECTION, SOLUTION INTRAMUSCULAR; INTRAVENOUS at 16:14

## 2018-08-06 PROCEDURE — 90870 ELECTROCONVULSIVE THERAPY: CPT

## 2018-08-06 RX ORDER — MIDAZOLAM HYDROCHLORIDE 1 MG/ML
2 INJECTION, SOLUTION INTRAMUSCULAR; INTRAVENOUS ONCE
Qty: 0 | Refills: 0 | Status: DISCONTINUED | OUTPATIENT
Start: 2018-08-06 | End: 2018-08-06

## 2018-08-06 RX ADMIN — MIDAZOLAM HYDROCHLORIDE 2 MILLIGRAM(S): 1 INJECTION, SOLUTION INTRAMUSCULAR; INTRAVENOUS at 15:07

## 2018-08-09 PROCEDURE — 90870 ELECTROCONVULSIVE THERAPY: CPT

## 2018-08-09 RX ORDER — MIDAZOLAM HYDROCHLORIDE 1 MG/ML
2 INJECTION, SOLUTION INTRAMUSCULAR; INTRAVENOUS ONCE
Qty: 0 | Refills: 0 | Status: DISCONTINUED | OUTPATIENT
Start: 2018-08-09 | End: 2018-08-09

## 2018-08-09 RX ADMIN — MIDAZOLAM HYDROCHLORIDE 2 MILLIGRAM(S): 1 INJECTION, SOLUTION INTRAMUSCULAR; INTRAVENOUS at 16:00

## 2018-08-14 PROCEDURE — 90870 ELECTROCONVULSIVE THERAPY: CPT

## 2018-08-14 RX ORDER — MIDAZOLAM HYDROCHLORIDE 1 MG/ML
2 INJECTION, SOLUTION INTRAMUSCULAR; INTRAVENOUS ONCE
Qty: 0 | Refills: 0 | Status: DISCONTINUED | OUTPATIENT
Start: 2018-08-14 | End: 2018-08-14

## 2018-08-14 RX ADMIN — MIDAZOLAM HYDROCHLORIDE 2 MILLIGRAM(S): 1 INJECTION, SOLUTION INTRAMUSCULAR; INTRAVENOUS at 15:00

## 2018-08-21 PROCEDURE — 90870 ELECTROCONVULSIVE THERAPY: CPT

## 2018-08-21 RX ORDER — MIDAZOLAM HYDROCHLORIDE 1 MG/ML
2 INJECTION, SOLUTION INTRAMUSCULAR; INTRAVENOUS ONCE
Qty: 0 | Refills: 0 | Status: DISCONTINUED | OUTPATIENT
Start: 2018-08-21 | End: 2018-08-21

## 2018-08-21 RX ADMIN — MIDAZOLAM HYDROCHLORIDE 2 MILLIGRAM(S): 1 INJECTION, SOLUTION INTRAMUSCULAR; INTRAVENOUS at 16:41

## 2018-08-28 PROCEDURE — 90870 ELECTROCONVULSIVE THERAPY: CPT

## 2018-08-28 RX ORDER — MIDAZOLAM HYDROCHLORIDE 1 MG/ML
2 INJECTION, SOLUTION INTRAMUSCULAR; INTRAVENOUS ONCE
Qty: 0 | Refills: 0 | Status: DISCONTINUED | OUTPATIENT
Start: 2018-08-28 | End: 2018-08-28

## 2018-08-28 RX ADMIN — MIDAZOLAM HYDROCHLORIDE 2 MILLIGRAM(S): 1 INJECTION, SOLUTION INTRAMUSCULAR; INTRAVENOUS at 12:05

## 2018-09-07 PROCEDURE — 90870 ELECTROCONVULSIVE THERAPY: CPT

## 2018-09-07 RX ORDER — MIDAZOLAM HYDROCHLORIDE 1 MG/ML
2 INJECTION, SOLUTION INTRAMUSCULAR; INTRAVENOUS ONCE
Qty: 0 | Refills: 0 | Status: DISCONTINUED | OUTPATIENT
Start: 2018-09-07 | End: 2018-09-07

## 2018-09-07 RX ADMIN — MIDAZOLAM HYDROCHLORIDE 2 MILLIGRAM(S): 1 INJECTION, SOLUTION INTRAMUSCULAR; INTRAVENOUS at 14:08

## 2018-09-21 PROCEDURE — 90870 ELECTROCONVULSIVE THERAPY: CPT

## 2018-09-21 RX ORDER — MIDAZOLAM HYDROCHLORIDE 1 MG/ML
2 INJECTION, SOLUTION INTRAMUSCULAR; INTRAVENOUS ONCE
Qty: 0 | Refills: 0 | Status: DISCONTINUED | OUTPATIENT
Start: 2018-09-21 | End: 2018-09-21

## 2018-09-21 RX ADMIN — MIDAZOLAM HYDROCHLORIDE 2 MILLIGRAM(S): 1 INJECTION, SOLUTION INTRAMUSCULAR; INTRAVENOUS at 15:15

## 2018-10-05 PROCEDURE — 90870 ELECTROCONVULSIVE THERAPY: CPT

## 2018-10-05 RX ORDER — MIDAZOLAM HYDROCHLORIDE 1 MG/ML
2 INJECTION, SOLUTION INTRAMUSCULAR; INTRAVENOUS ONCE
Qty: 0 | Refills: 0 | Status: DISCONTINUED | OUTPATIENT
Start: 2018-10-05 | End: 2018-10-05

## 2018-10-05 RX ADMIN — MIDAZOLAM HYDROCHLORIDE 2 MILLIGRAM(S): 1 INJECTION, SOLUTION INTRAMUSCULAR; INTRAVENOUS at 14:35

## 2018-10-19 PROCEDURE — 90870 ELECTROCONVULSIVE THERAPY: CPT

## 2018-10-19 RX ORDER — MIDAZOLAM HYDROCHLORIDE 1 MG/ML
2 INJECTION, SOLUTION INTRAMUSCULAR; INTRAVENOUS ONCE
Qty: 0 | Refills: 0 | Status: DISCONTINUED | OUTPATIENT
Start: 2018-10-19 | End: 2018-10-19

## 2018-10-19 RX ADMIN — MIDAZOLAM HYDROCHLORIDE 2 MILLIGRAM(S): 1 INJECTION, SOLUTION INTRAMUSCULAR; INTRAVENOUS at 15:46

## 2018-10-24 PROCEDURE — 90870 ELECTROCONVULSIVE THERAPY: CPT

## 2018-10-24 RX ORDER — MIDAZOLAM HYDROCHLORIDE 1 MG/ML
2 INJECTION, SOLUTION INTRAMUSCULAR; INTRAVENOUS ONCE
Qty: 0 | Refills: 0 | Status: DISCONTINUED | OUTPATIENT
Start: 2018-10-24 | End: 2018-10-24

## 2018-10-24 RX ADMIN — MIDAZOLAM HYDROCHLORIDE 2 MILLIGRAM(S): 1 INJECTION, SOLUTION INTRAMUSCULAR; INTRAVENOUS at 11:33

## 2018-10-31 PROCEDURE — 90870 ELECTROCONVULSIVE THERAPY: CPT

## 2018-11-13 PROCEDURE — 90870 ELECTROCONVULSIVE THERAPY: CPT

## 2018-11-13 RX ORDER — MIDAZOLAM HYDROCHLORIDE 1 MG/ML
2 INJECTION, SOLUTION INTRAMUSCULAR; INTRAVENOUS ONCE
Qty: 0 | Refills: 0 | Status: DISCONTINUED | OUTPATIENT
Start: 2018-11-13 | End: 2018-11-13

## 2018-11-13 RX ADMIN — MIDAZOLAM HYDROCHLORIDE 2 MILLIGRAM(S): 1 INJECTION, SOLUTION INTRAMUSCULAR; INTRAVENOUS at 12:33

## 2018-12-13 PROCEDURE — 90870 ELECTROCONVULSIVE THERAPY: CPT

## 2018-12-13 RX ORDER — MIDAZOLAM HYDROCHLORIDE 1 MG/ML
2 INJECTION, SOLUTION INTRAMUSCULAR; INTRAVENOUS ONCE
Qty: 0 | Refills: 0 | Status: DISCONTINUED | OUTPATIENT
Start: 2018-12-13 | End: 2018-12-13

## 2018-12-13 RX ADMIN — MIDAZOLAM HYDROCHLORIDE 2 MILLIGRAM(S): 1 INJECTION, SOLUTION INTRAMUSCULAR; INTRAVENOUS at 13:57

## 2019-01-10 PROCEDURE — 90870 ELECTROCONVULSIVE THERAPY: CPT

## 2019-02-13 PROCEDURE — 90870 ELECTROCONVULSIVE THERAPY: CPT

## 2019-03-18 PROCEDURE — 90870 ELECTROCONVULSIVE THERAPY: CPT

## 2019-04-19 PROCEDURE — 90870 ELECTROCONVULSIVE THERAPY: CPT

## 2019-04-24 PROCEDURE — 90870 ELECTROCONVULSIVE THERAPY: CPT

## 2019-04-26 PROCEDURE — 90870 ELECTROCONVULSIVE THERAPY: CPT

## 2019-04-26 RX ORDER — MIDAZOLAM HYDROCHLORIDE 1 MG/ML
2 INJECTION, SOLUTION INTRAMUSCULAR; INTRAVENOUS ONCE
Qty: 0 | Refills: 0 | Status: DISCONTINUED | OUTPATIENT
Start: 2019-04-26 | End: 2019-04-26

## 2019-04-26 RX ADMIN — MIDAZOLAM HYDROCHLORIDE 2 MILLIGRAM(S): 1 INJECTION, SOLUTION INTRAMUSCULAR; INTRAVENOUS at 08:07

## 2019-05-02 PROCEDURE — 90870 ELECTROCONVULSIVE THERAPY: CPT

## 2019-05-09 PROCEDURE — 90870 ELECTROCONVULSIVE THERAPY: CPT

## 2019-05-18 ENCOUNTER — INPATIENT (INPATIENT)
Facility: HOSPITAL | Age: 24
LOS: 22 days | Discharge: HOME | End: 2019-06-10
Attending: PSYCHIATRY & NEUROLOGY | Admitting: PSYCHIATRY & NEUROLOGY
Payer: COMMERCIAL

## 2019-05-18 VITALS
HEART RATE: 89 BPM | RESPIRATION RATE: 18 BRPM | OXYGEN SATURATION: 98 % | SYSTOLIC BLOOD PRESSURE: 114 MMHG | DIASTOLIC BLOOD PRESSURE: 70 MMHG | TEMPERATURE: 98 F

## 2019-05-18 DIAGNOSIS — F31.4 BIPOLAR DISORDER, CURRENT EPISODE DEPRESSED, SEVERE, WITHOUT PSYCHOTIC FEATURES: ICD-10-CM

## 2019-05-18 DIAGNOSIS — F60.3 BORDERLINE PERSONALITY DISORDER: ICD-10-CM

## 2019-05-18 LAB
ALBUMIN SERPL ELPH-MCNC: 4.6 G/DL — SIGNIFICANT CHANGE UP (ref 3.3–5)
ALP SERPL-CCNC: 82 U/L — SIGNIFICANT CHANGE UP (ref 40–120)
ALT FLD-CCNC: 36 U/L — HIGH (ref 4–33)
AMPHET UR-MCNC: NEGATIVE — SIGNIFICANT CHANGE UP
ANION GAP SERPL CALC-SCNC: 14 MMO/L — SIGNIFICANT CHANGE UP (ref 7–14)
APAP SERPL-MCNC: < 15 UG/ML — LOW (ref 15–25)
APPEARANCE UR: CLEAR — SIGNIFICANT CHANGE UP
AST SERPL-CCNC: 18 U/L — SIGNIFICANT CHANGE UP (ref 4–32)
BARBITURATES UR SCN-MCNC: NEGATIVE — SIGNIFICANT CHANGE UP
BASOPHILS # BLD AUTO: 0.03 K/UL — SIGNIFICANT CHANGE UP (ref 0–0.2)
BASOPHILS NFR BLD AUTO: 0.4 % — SIGNIFICANT CHANGE UP (ref 0–2)
BENZODIAZ UR-MCNC: NEGATIVE — SIGNIFICANT CHANGE UP
BILIRUB SERPL-MCNC: 0.4 MG/DL — SIGNIFICANT CHANGE UP (ref 0.2–1.2)
BILIRUB UR-MCNC: NEGATIVE — SIGNIFICANT CHANGE UP
BLOOD UR QL VISUAL: NEGATIVE — SIGNIFICANT CHANGE UP
BUN SERPL-MCNC: 12 MG/DL — SIGNIFICANT CHANGE UP (ref 7–23)
CALCIUM SERPL-MCNC: 10.2 MG/DL — SIGNIFICANT CHANGE UP (ref 8.4–10.5)
CANNABINOIDS UR-MCNC: NEGATIVE — SIGNIFICANT CHANGE UP
CHLORIDE SERPL-SCNC: 104 MMOL/L — SIGNIFICANT CHANGE UP (ref 98–107)
CO2 SERPL-SCNC: 22 MMOL/L — SIGNIFICANT CHANGE UP (ref 22–31)
COCAINE METAB.OTHER UR-MCNC: NEGATIVE — SIGNIFICANT CHANGE UP
COLOR SPEC: SIGNIFICANT CHANGE UP
CREAT SERPL-MCNC: 0.94 MG/DL — SIGNIFICANT CHANGE UP (ref 0.5–1.3)
EOSINOPHIL # BLD AUTO: 0.09 K/UL — SIGNIFICANT CHANGE UP (ref 0–0.5)
EOSINOPHIL NFR BLD AUTO: 1.2 % — SIGNIFICANT CHANGE UP (ref 0–6)
ETHANOL BLD-MCNC: < 10 MG/DL — SIGNIFICANT CHANGE UP
GLUCOSE SERPL-MCNC: 97 MG/DL — SIGNIFICANT CHANGE UP (ref 70–99)
GLUCOSE UR-MCNC: NEGATIVE — SIGNIFICANT CHANGE UP
HCG SERPL-ACNC: < 5 MIU/ML — SIGNIFICANT CHANGE UP
HCT VFR BLD CALC: 40.6 % — SIGNIFICANT CHANGE UP (ref 34.5–45)
HGB BLD-MCNC: 13.6 G/DL — SIGNIFICANT CHANGE UP (ref 11.5–15.5)
IMM GRANULOCYTES NFR BLD AUTO: 0.4 % — SIGNIFICANT CHANGE UP (ref 0–1.5)
KETONES UR-MCNC: NEGATIVE — SIGNIFICANT CHANGE UP
LEUKOCYTE ESTERASE UR-ACNC: NEGATIVE — SIGNIFICANT CHANGE UP
LITHIUM SERPL-MCNC: 0.58 MMOL/L — LOW (ref 0.6–1.2)
LYMPHOCYTES # BLD AUTO: 1.6 K/UL — SIGNIFICANT CHANGE UP (ref 1–3.3)
LYMPHOCYTES # BLD AUTO: 21.1 % — SIGNIFICANT CHANGE UP (ref 13–44)
MCHC RBC-ENTMCNC: 30 PG — SIGNIFICANT CHANGE UP (ref 27–34)
MCHC RBC-ENTMCNC: 33.5 % — SIGNIFICANT CHANGE UP (ref 32–36)
MCV RBC AUTO: 89.6 FL — SIGNIFICANT CHANGE UP (ref 80–100)
METHADONE UR-MCNC: NEGATIVE — SIGNIFICANT CHANGE UP
MONOCYTES # BLD AUTO: 0.37 K/UL — SIGNIFICANT CHANGE UP (ref 0–0.9)
MONOCYTES NFR BLD AUTO: 4.9 % — SIGNIFICANT CHANGE UP (ref 2–14)
NEUTROPHILS # BLD AUTO: 5.48 K/UL — SIGNIFICANT CHANGE UP (ref 1.8–7.4)
NEUTROPHILS NFR BLD AUTO: 72 % — SIGNIFICANT CHANGE UP (ref 43–77)
NITRITE UR-MCNC: NEGATIVE — SIGNIFICANT CHANGE UP
NRBC # FLD: 0 K/UL — SIGNIFICANT CHANGE UP (ref 0–0)
OPIATES UR-MCNC: NEGATIVE — SIGNIFICANT CHANGE UP
OXYCODONE UR-MCNC: NEGATIVE — SIGNIFICANT CHANGE UP
PCP UR-MCNC: NEGATIVE — SIGNIFICANT CHANGE UP
PH UR: 6.5 — SIGNIFICANT CHANGE UP (ref 5–8)
PLATELET # BLD AUTO: 252 K/UL — SIGNIFICANT CHANGE UP (ref 150–400)
PMV BLD: 9.7 FL — SIGNIFICANT CHANGE UP (ref 7–13)
POTASSIUM SERPL-MCNC: 4.4 MMOL/L — SIGNIFICANT CHANGE UP (ref 3.5–5.3)
POTASSIUM SERPL-SCNC: 4.4 MMOL/L — SIGNIFICANT CHANGE UP (ref 3.5–5.3)
PROT SERPL-MCNC: 7.2 G/DL — SIGNIFICANT CHANGE UP (ref 6–8.3)
PROT UR-MCNC: NEGATIVE — SIGNIFICANT CHANGE UP
RBC # BLD: 4.53 M/UL — SIGNIFICANT CHANGE UP (ref 3.8–5.2)
RBC # FLD: 11.6 % — SIGNIFICANT CHANGE UP (ref 10.3–14.5)
SALICYLATES SERPL-MCNC: < 5 MG/DL — LOW (ref 15–30)
SODIUM SERPL-SCNC: 140 MMOL/L — SIGNIFICANT CHANGE UP (ref 135–145)
SP GR SPEC: 1.01 — SIGNIFICANT CHANGE UP (ref 1–1.04)
TSH SERPL-MCNC: 2.44 UIU/ML — SIGNIFICANT CHANGE UP (ref 0.27–4.2)
UROBILINOGEN FLD QL: NORMAL — SIGNIFICANT CHANGE UP
WBC # BLD: 7.6 K/UL — SIGNIFICANT CHANGE UP (ref 3.8–10.5)
WBC # FLD AUTO: 7.6 K/UL — SIGNIFICANT CHANGE UP (ref 3.8–10.5)

## 2019-05-18 PROCEDURE — 99285 EMERGENCY DEPT VISIT HI MDM: CPT | Mod: GC

## 2019-05-18 RX ORDER — LITHIUM CARBONATE 300 MG/1
600 TABLET, EXTENDED RELEASE ORAL AT BEDTIME
Refills: 0 | Status: DISCONTINUED | OUTPATIENT
Start: 2019-05-18 | End: 2019-05-20

## 2019-05-18 RX ORDER — CLONAZEPAM 1 MG
0.25 TABLET ORAL DAILY
Refills: 0 | Status: DISCONTINUED | OUTPATIENT
Start: 2019-05-18 | End: 2019-05-25

## 2019-05-18 RX ORDER — CLONAZEPAM 1 MG
1 TABLET ORAL AT BEDTIME
Refills: 0 | Status: DISCONTINUED | OUTPATIENT
Start: 2019-05-18 | End: 2019-05-25

## 2019-05-18 RX ORDER — RISPERIDONE 4 MG/1
2 TABLET ORAL THREE TIMES A DAY
Refills: 0 | Status: DISCONTINUED | OUTPATIENT
Start: 2019-05-18 | End: 2019-06-10

## 2019-05-18 RX ORDER — LITHIUM CARBONATE 300 MG/1
150 TABLET, EXTENDED RELEASE ORAL DAILY
Refills: 0 | Status: DISCONTINUED | OUTPATIENT
Start: 2019-05-18 | End: 2019-05-20

## 2019-05-18 RX ORDER — PROPRANOLOL HCL 160 MG
20 CAPSULE, EXTENDED RELEASE 24HR ORAL THREE TIMES A DAY
Refills: 0 | Status: DISCONTINUED | OUTPATIENT
Start: 2019-05-18 | End: 2019-05-19

## 2019-05-18 RX ADMIN — Medication 20 MILLIGRAM(S): at 21:18

## 2019-05-18 RX ADMIN — RISPERIDONE 2 MILLIGRAM(S): 4 TABLET ORAL at 21:18

## 2019-05-18 RX ADMIN — Medication 1 MILLIGRAM(S): at 21:18

## 2019-05-18 RX ADMIN — LITHIUM CARBONATE 600 MILLIGRAM(S): 300 TABLET, EXTENDED RELEASE ORAL at 21:18

## 2019-05-18 NOTE — ED PROVIDER NOTE - CARE PLAN
Principal Discharge DX:	Bipolar disorder, current episode depressed, severe, without psychotic features  Secondary Diagnosis:	Borderline personality disorder
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
## 2019-05-18 NOTE — ED ADULT NURSE REASSESSMENT NOTE - NS ED NURSE REASSESS COMMENT FT1
Patient presents calm and cooperative, NAD, pt placed in  room 2, blood work and EKG done, pt currently in  hallway chair awaiting further MD evaluation, will continue to monitor pt.

## 2019-05-18 NOTE — ED BEHAVIORAL HEALTH ASSESSMENT NOTE - REFERRAL / APPOINTMENT DETAILS
Continue outpatient treatment. Patient is pre-registered for ECT in the system. Attempt made to contact Dr. Hoyos to reschedule appt however the office is closed and voicemail was received. Message was left.

## 2019-05-18 NOTE — ED ADULT NURSE NOTE - CHIEF COMPLAINT QUOTE
pt with Hx. Bi-polar coming with SI since this AM with a plan pt stated missed her ACT treatment that usually helps

## 2019-05-18 NOTE — ED BEHAVIORAL HEALTH ASSESSMENT NOTE - OTHER PAST PSYCHIATRIC HISTORY (INCLUDE DETAILS REGARDING ONSET, COURSE OF ILLNESS, INPATIENT/OUTPATIENT TREATMENT)
See HPI. Previously reported history of bipolar disorder (rapid cylcing), ADHD and depression with SI since 5yo, multiple psych hospitalizations (last one was a few years ago), two suicide attempts by OD on Lithium when she was 17yo and by suffocation at age 17, hx of cutting, and she is currently being followed by Dr. Baeza (psychiatrist for 6 years) and Laya Vyas (weekly therapist for the past few weeks). See HPI. Previously reported history of bipolar disorder (rapid cylcing), ADHD and depression with SI since 7yo, multiple psych hospitalizations (last one was 2018), two suicide attempts by OD on Lithium when she was 19yo and by suffocation at age 17, hx of cutting, and she is currently being followed by Dr. Baeza (psychiatrist for 6 years) and Ashish (twice weekly therapy x 1 month)

## 2019-05-18 NOTE — ED BEHAVIORAL HEALTH ASSESSMENT NOTE - RISK ASSESSMENT
Risk factors include recent inpt hospitalization, prior suicide attempts, impulsivity, interpersonal instability, and recent medication changes. Protective factors includes no current active SI/HI/I/P, positive outpatient therapeutic relationships, abstinence from substances, no access to guns, no legal problems, compliance with medications, future oriented (states she needs to be alive for her dog), able to engage in safety planning, and denies global insomnia. At this time pt is at low imminent risk of harm but at elevated chronic risk. Chronic risk factors cannot be modified by an acute inpt hospitalization and better addressed in an intensive and long term outpatient setting. Risk factors include recent inpt hospitalization, prior suicide attempts, impulsivity, interpersonal instability, and recent medication changes. Protective factors includes no current active SI/HI/I/P, positive outpatient therapeutic relationships, abstinence from substances, no access to guns, no legal problems, High risk.  Risk factors include recent inpt hospitalization, prior suicide attempts, impulsivity, interpersonal instability, and recent medication changes. Protective factors includes no current active SI/HI/I/P, positive outpatient therapeutic relationships, abstinence from substances, no access to guns, no legal problems,

## 2019-05-18 NOTE — ED BEHAVIORAL HEALTH ASSESSMENT NOTE - SUICIDE PROTECTIVE FACTORS
Identifies reasons for living/Future oriented/Positive therapeutic relationships Positive therapeutic relationships
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
## 2019-05-18 NOTE — ED BEHAVIORAL HEALTH ASSESSMENT NOTE - DESCRIPTION
Pleasant, calm, cooperative PCOS Patient is living with her parents. She is unemployed and parents financially support her. She did some college. calm  Vital Signs Last 24 Hrs  T(C): 36.8 (18 May 2019 15:50), Max: 36.9 (18 May 2019 14:04)  T(F): 98.3 (18 May 2019 15:50), Max: 98.4 (18 May 2019 14:04)  HR: 79 (18 May 2019 15:50) (79 - 89)  BP: 117/72 (18 May 2019 15:50) (114/70 - 117/72)  BP(mean): --  RR: 15 (18 May 2019 15:50) (15 - 18)  SpO2: 100% (18 May 2019 15:50) (98% - 100%) calm, no agitation, in good behavioral control, no prns required.    Vital Signs Last 24 Hrs  T(C): 36.8 (18 May 2019 15:50), Max: 36.9 (18 May 2019 14:04)  T(F): 98.3 (18 May 2019 15:50), Max: 98.4 (18 May 2019 14:04)  HR: 79 (18 May 2019 15:50) (79 - 89)  BP: 117/72 (18 May 2019 15:50) (114/70 - 117/72)  BP(mean): --  RR: 15 (18 May 2019 15:50) (15 - 18)  SpO2: 100% (18 May 2019 15:50) (98% - 100%)

## 2019-05-18 NOTE — ED BEHAVIORAL HEALTH ASSESSMENT NOTE - PAST PSYCHOTROPIC MEDICATION
Lithium, depakote, xanax, ketamine infusions Lithium, depakote, xanax, ketamine infusions  Lamictal (rash on mouth), Zyprxa (significant weight gain), Seroquel "tics"

## 2019-05-18 NOTE — ED BEHAVIORAL HEALTH ASSESSMENT NOTE - DESCRIPTION (FIRST USE, LAST USE, QUANTITY, FREQUENCY, DURATION)
Reports remote hx of abusing alcohol and Alprazolam when she was 18yo. Denies hx of withdrawal sx including withdrawal seizures. Denies current alcohol or drug use. see above

## 2019-05-18 NOTE — ED BEHAVIORAL HEALTH ASSESSMENT NOTE - CURRENT MEDICATION
Risperdal 4mg QHS, Klonopin 0.5mg BID, synthroid 50 mcg, OCP Risperidone 2 mg TID, Lithium 600 mg  mg QAM, Propranolol 20 mg TID, Klonopin 0.25 mg QAM and 1 mg QHS
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
## 2019-05-18 NOTE — ED BEHAVIORAL HEALTH NOTE - BEHAVIORAL HEALTH NOTE
YUDITH contacted [facility] at [phone] to obtain insurance authorization for pt admission to [facility].  YUDITH spoke withamber Naik; clinical information provided.  As per Heena, pt is authorized for five days at [facility].  5/18-5/22/19; review due on 5/22/19.  UR to contact [facility] for additional days.  Auth # [id number].

## 2019-05-18 NOTE — ED BEHAVIORAL HEALTH ASSESSMENT NOTE - PRIMARY DX
Adjustment disorder with disturbance of conduct Borderline personality disorder Bipolar disorder, current episode depressed, severe, without psychotic features
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
## 2019-05-18 NOTE — ED BEHAVIORAL HEALTH ASSESSMENT NOTE - AXIS IV
Problem related to social environment/Housing problems/Problems with primary support Problem related to social environment/Problems with primary support

## 2019-05-18 NOTE — ED BEHAVIORAL HEALTH ASSESSMENT NOTE - PSYCHIATRIC ISSUES AND PLAN (INCLUDE STANDING AND PRN MEDICATION)
Risperidone 2 mg TID, Lithium 600 mg  mg QAM, Propranolol 20 mg TID, Klonopin 0.25 mg QAM and 1 mg QHS; will e-mail ECT doctors about admission

## 2019-05-18 NOTE — ED PROVIDER NOTE - CLINICAL SUMMARY MEDICAL DECISION MAKING FREE TEXT BOX
22 y/o F hx OCD, Bipolar  Labs, Urine Tox/UA, EKG  Medical evaluation performed. There is no clinical evidence of intoxication or any acute medical problem requiring immediate intervention. Patient is awaiting psychiatric consultation. Final disposition will be determined by psychiatrist.

## 2019-05-19 RX ORDER — PROPRANOLOL HCL 160 MG
20 CAPSULE, EXTENDED RELEASE 24HR ORAL
Refills: 0 | Status: DISCONTINUED | OUTPATIENT
Start: 2019-05-19 | End: 2019-06-01

## 2019-05-19 RX ADMIN — Medication 2 MILLIGRAM(S): at 21:42

## 2019-05-19 RX ADMIN — RISPERIDONE 2 MILLIGRAM(S): 4 TABLET ORAL at 13:22

## 2019-05-19 RX ADMIN — RISPERIDONE 2 MILLIGRAM(S): 4 TABLET ORAL at 21:42

## 2019-05-19 RX ADMIN — Medication 20 MILLIGRAM(S): at 09:09

## 2019-05-19 RX ADMIN — Medication 1 MILLIGRAM(S): at 21:42

## 2019-05-19 RX ADMIN — LITHIUM CARBONATE 150 MILLIGRAM(S): 300 TABLET, EXTENDED RELEASE ORAL at 09:09

## 2019-05-19 RX ADMIN — Medication 0.25 MILLIGRAM(S): at 09:09

## 2019-05-19 RX ADMIN — Medication 20 MILLIGRAM(S): at 13:22

## 2019-05-19 RX ADMIN — RISPERIDONE 2 MILLIGRAM(S): 4 TABLET ORAL at 09:09

## 2019-05-19 RX ADMIN — LITHIUM CARBONATE 600 MILLIGRAM(S): 300 TABLET, EXTENDED RELEASE ORAL at 21:42

## 2019-05-20 PROCEDURE — 90832 PSYTX W PT 30 MINUTES: CPT

## 2019-05-20 PROCEDURE — 99232 SBSQ HOSP IP/OBS MODERATE 35: CPT

## 2019-05-20 RX ORDER — LORATADINE 10 MG/1
10 TABLET ORAL DAILY
Refills: 0 | Status: DISCONTINUED | OUTPATIENT
Start: 2019-05-20 | End: 2019-06-10

## 2019-05-20 RX ORDER — NORETHINDRONE AND ETHINYL ESTRADIOL 0.4-0.035
1 KIT ORAL ONCE
Refills: 0 | Status: COMPLETED | OUTPATIENT
Start: 2019-05-20 | End: 2019-05-20

## 2019-05-20 RX ORDER — NORETHINDRONE AND ETHINYL ESTRADIOL 0.4-0.035
1 KIT ORAL DAILY
Refills: 0 | Status: DISCONTINUED | OUTPATIENT
Start: 2019-05-21 | End: 2019-06-10

## 2019-05-20 RX ORDER — TOPIRAMATE 25 MG
25 TABLET ORAL
Refills: 0 | Status: DISCONTINUED | OUTPATIENT
Start: 2019-05-20 | End: 2019-05-22

## 2019-05-20 RX ADMIN — LITHIUM CARBONATE 150 MILLIGRAM(S): 300 TABLET, EXTENDED RELEASE ORAL at 08:44

## 2019-05-20 RX ADMIN — Medication 25 MILLIGRAM(S): at 22:17

## 2019-05-20 RX ADMIN — RISPERIDONE 2 MILLIGRAM(S): 4 TABLET ORAL at 13:58

## 2019-05-20 RX ADMIN — RISPERIDONE 2 MILLIGRAM(S): 4 TABLET ORAL at 08:44

## 2019-05-20 RX ADMIN — RISPERIDONE 2 MILLIGRAM(S): 4 TABLET ORAL at 22:17

## 2019-05-20 RX ADMIN — NORETHINDRONE AND ETHINYL ESTRADIOL 1 TABLET(S): KIT at 14:04

## 2019-05-20 RX ADMIN — Medication 0.25 MILLIGRAM(S): at 08:44

## 2019-05-20 RX ADMIN — Medication 1 MILLIGRAM(S): at 22:17

## 2019-05-20 RX ADMIN — Medication 2 MILLIGRAM(S): at 20:15

## 2019-05-20 RX ADMIN — Medication 20 MILLIGRAM(S): at 13:58

## 2019-05-20 RX ADMIN — LORATADINE 10 MILLIGRAM(S): 10 TABLET ORAL at 11:38

## 2019-05-20 RX ADMIN — Medication 20 MILLIGRAM(S): at 09:28

## 2019-05-21 PROCEDURE — 99232 SBSQ HOSP IP/OBS MODERATE 35: CPT

## 2019-05-21 RX ADMIN — Medication 20 MILLIGRAM(S): at 14:47

## 2019-05-21 RX ADMIN — Medication 0.25 MILLIGRAM(S): at 08:32

## 2019-05-21 RX ADMIN — Medication 25 MILLIGRAM(S): at 08:34

## 2019-05-21 RX ADMIN — RISPERIDONE 2 MILLIGRAM(S): 4 TABLET ORAL at 22:17

## 2019-05-21 RX ADMIN — NORETHINDRONE AND ETHINYL ESTRADIOL 1 TABLET(S): KIT at 08:32

## 2019-05-21 RX ADMIN — Medication 20 MILLIGRAM(S): at 08:34

## 2019-05-21 RX ADMIN — Medication 1 MILLIGRAM(S): at 22:17

## 2019-05-21 RX ADMIN — RISPERIDONE 2 MILLIGRAM(S): 4 TABLET ORAL at 14:43

## 2019-05-21 RX ADMIN — LORATADINE 10 MILLIGRAM(S): 10 TABLET ORAL at 08:33

## 2019-05-21 RX ADMIN — Medication 25 MILLIGRAM(S): at 22:17

## 2019-05-21 RX ADMIN — RISPERIDONE 2 MILLIGRAM(S): 4 TABLET ORAL at 08:34

## 2019-05-22 PROCEDURE — 99232 SBSQ HOSP IP/OBS MODERATE 35: CPT | Mod: 25

## 2019-05-22 PROCEDURE — 90853 GROUP PSYCHOTHERAPY: CPT

## 2019-05-22 PROCEDURE — 90832 PSYTX W PT 30 MINUTES: CPT | Mod: 59

## 2019-05-22 RX ORDER — HALOPERIDOL DECANOATE 100 MG/ML
5 INJECTION INTRAMUSCULAR ONCE
Refills: 0 | Status: COMPLETED | OUTPATIENT
Start: 2019-05-22 | End: 2019-05-22

## 2019-05-22 RX ORDER — TOPIRAMATE 25 MG
25 TABLET ORAL AT BEDTIME
Refills: 0 | Status: COMPLETED | OUTPATIENT
Start: 2019-05-22 | End: 2019-05-22

## 2019-05-22 RX ORDER — BENZTROPINE MESYLATE 1 MG
1 TABLET ORAL ONCE
Refills: 0 | Status: COMPLETED | OUTPATIENT
Start: 2019-05-22 | End: 2019-05-22

## 2019-05-22 RX ORDER — TOPIRAMATE 25 MG
50 TABLET ORAL
Refills: 0 | Status: DISCONTINUED | OUTPATIENT
Start: 2019-05-23 | End: 2019-05-28

## 2019-05-22 RX ADMIN — Medication 2 MILLIGRAM(S): at 16:35

## 2019-05-22 RX ADMIN — RISPERIDONE 2 MILLIGRAM(S): 4 TABLET ORAL at 22:27

## 2019-05-22 RX ADMIN — Medication 0.25 MILLIGRAM(S): at 08:48

## 2019-05-22 RX ADMIN — RISPERIDONE 2 MILLIGRAM(S): 4 TABLET ORAL at 08:48

## 2019-05-22 RX ADMIN — LORATADINE 10 MILLIGRAM(S): 10 TABLET ORAL at 08:48

## 2019-05-22 RX ADMIN — Medication 20 MILLIGRAM(S): at 08:48

## 2019-05-22 RX ADMIN — RISPERIDONE 2 MILLIGRAM(S): 4 TABLET ORAL at 12:55

## 2019-05-22 RX ADMIN — Medication 20 MILLIGRAM(S): at 12:55

## 2019-05-22 RX ADMIN — Medication 25 MILLIGRAM(S): at 22:27

## 2019-05-22 RX ADMIN — Medication 25 MILLIGRAM(S): at 08:48

## 2019-05-22 RX ADMIN — Medication 1 MILLIGRAM(S): at 22:27

## 2019-05-22 RX ADMIN — NORETHINDRONE AND ETHINYL ESTRADIOL 1 TABLET(S): KIT at 08:48

## 2019-05-23 PROCEDURE — 99232 SBSQ HOSP IP/OBS MODERATE 35: CPT

## 2019-05-23 RX ADMIN — Medication 0.25 MILLIGRAM(S): at 09:26

## 2019-05-23 RX ADMIN — RISPERIDONE 2 MILLIGRAM(S): 4 TABLET ORAL at 12:46

## 2019-05-23 RX ADMIN — Medication 20 MILLIGRAM(S): at 12:46

## 2019-05-23 RX ADMIN — LORATADINE 10 MILLIGRAM(S): 10 TABLET ORAL at 09:26

## 2019-05-23 RX ADMIN — NORETHINDRONE AND ETHINYL ESTRADIOL 1 TABLET(S): KIT at 09:26

## 2019-05-23 RX ADMIN — RISPERIDONE 2 MILLIGRAM(S): 4 TABLET ORAL at 09:26

## 2019-05-23 RX ADMIN — Medication 50 MILLIGRAM(S): at 09:26

## 2019-05-23 RX ADMIN — Medication 50 MILLIGRAM(S): at 21:09

## 2019-05-23 RX ADMIN — Medication 20 MILLIGRAM(S): at 09:26

## 2019-05-23 RX ADMIN — RISPERIDONE 2 MILLIGRAM(S): 4 TABLET ORAL at 21:09

## 2019-05-23 RX ADMIN — Medication 1 MILLIGRAM(S): at 21:09

## 2019-05-24 PROCEDURE — 99232 SBSQ HOSP IP/OBS MODERATE 35: CPT

## 2019-05-24 PROCEDURE — 90832 PSYTX W PT 30 MINUTES: CPT

## 2019-05-24 RX ORDER — LANOLIN ALCOHOL/MO/W.PET/CERES
5 CREAM (GRAM) TOPICAL AT BEDTIME
Refills: 0 | Status: DISCONTINUED | OUTPATIENT
Start: 2019-05-24 | End: 2019-06-10

## 2019-05-24 RX ORDER — DIPHENHYDRAMINE HCL 50 MG
50 CAPSULE ORAL ONCE
Refills: 0 | Status: COMPLETED | OUTPATIENT
Start: 2019-05-24 | End: 2019-05-24

## 2019-05-24 RX ADMIN — RISPERIDONE 2 MILLIGRAM(S): 4 TABLET ORAL at 21:57

## 2019-05-24 RX ADMIN — RISPERIDONE 2 MILLIGRAM(S): 4 TABLET ORAL at 08:46

## 2019-05-24 RX ADMIN — Medication 0.25 MILLIGRAM(S): at 08:46

## 2019-05-24 RX ADMIN — Medication 50 MILLIGRAM(S): at 21:57

## 2019-05-24 RX ADMIN — Medication 20 MILLIGRAM(S): at 13:13

## 2019-05-24 RX ADMIN — LORATADINE 10 MILLIGRAM(S): 10 TABLET ORAL at 08:46

## 2019-05-24 RX ADMIN — RISPERIDONE 2 MILLIGRAM(S): 4 TABLET ORAL at 13:13

## 2019-05-24 RX ADMIN — Medication 1 MILLIGRAM(S): at 21:57

## 2019-05-24 RX ADMIN — Medication 5 MILLIGRAM(S): at 00:16

## 2019-05-24 RX ADMIN — NORETHINDRONE AND ETHINYL ESTRADIOL 1 TABLET(S): KIT at 08:46

## 2019-05-24 RX ADMIN — Medication 20 MILLIGRAM(S): at 08:46

## 2019-05-24 RX ADMIN — Medication 50 MILLIGRAM(S): at 08:46

## 2019-05-25 RX ADMIN — Medication 50 MILLIGRAM(S): at 10:20

## 2019-05-25 RX ADMIN — NORETHINDRONE AND ETHINYL ESTRADIOL 1 TABLET(S): KIT at 10:20

## 2019-05-25 RX ADMIN — Medication 20 MILLIGRAM(S): at 12:11

## 2019-05-25 RX ADMIN — Medication 50 MILLIGRAM(S): at 21:51

## 2019-05-25 RX ADMIN — RISPERIDONE 2 MILLIGRAM(S): 4 TABLET ORAL at 21:51

## 2019-05-25 RX ADMIN — Medication 0.25 MILLIGRAM(S): at 10:20

## 2019-05-25 RX ADMIN — LORATADINE 10 MILLIGRAM(S): 10 TABLET ORAL at 10:20

## 2019-05-25 RX ADMIN — RISPERIDONE 2 MILLIGRAM(S): 4 TABLET ORAL at 12:11

## 2019-05-25 RX ADMIN — RISPERIDONE 2 MILLIGRAM(S): 4 TABLET ORAL at 10:20

## 2019-05-25 RX ADMIN — Medication 1 MILLIGRAM(S): at 21:51

## 2019-05-25 RX ADMIN — Medication 5 MILLIGRAM(S): at 21:51

## 2019-05-26 RX ORDER — CLONAZEPAM 1 MG
1 TABLET ORAL AT BEDTIME
Refills: 0 | Status: DISCONTINUED | OUTPATIENT
Start: 2019-05-26 | End: 2019-05-29

## 2019-05-26 RX ORDER — CLONAZEPAM 1 MG
0.25 TABLET ORAL ONCE
Refills: 0 | Status: DISCONTINUED | OUTPATIENT
Start: 2019-05-26 | End: 2019-05-26

## 2019-05-26 RX ORDER — CLONAZEPAM 1 MG
0.25 TABLET ORAL DAILY
Refills: 0 | Status: DISCONTINUED | OUTPATIENT
Start: 2019-05-27 | End: 2019-05-29

## 2019-05-26 RX ADMIN — Medication 50 MILLIGRAM(S): at 10:48

## 2019-05-26 RX ADMIN — RISPERIDONE 2 MILLIGRAM(S): 4 TABLET ORAL at 10:48

## 2019-05-26 RX ADMIN — LORATADINE 10 MILLIGRAM(S): 10 TABLET ORAL at 10:48

## 2019-05-26 RX ADMIN — RISPERIDONE 2 MILLIGRAM(S): 4 TABLET ORAL at 21:06

## 2019-05-26 RX ADMIN — Medication 1 MILLIGRAM(S): at 21:06

## 2019-05-26 RX ADMIN — Medication 0.25 MILLIGRAM(S): at 10:49

## 2019-05-26 RX ADMIN — NORETHINDRONE AND ETHINYL ESTRADIOL 1 TABLET(S): KIT at 10:48

## 2019-05-26 RX ADMIN — Medication 20 MILLIGRAM(S): at 13:05

## 2019-05-26 RX ADMIN — Medication 20 MILLIGRAM(S): at 10:48

## 2019-05-26 RX ADMIN — Medication 50 MILLIGRAM(S): at 21:06

## 2019-05-26 RX ADMIN — RISPERIDONE 2 MILLIGRAM(S): 4 TABLET ORAL at 13:05

## 2019-05-27 PROCEDURE — 99232 SBSQ HOSP IP/OBS MODERATE 35: CPT

## 2019-05-27 RX ADMIN — NORETHINDRONE AND ETHINYL ESTRADIOL 1 TABLET(S): KIT at 08:50

## 2019-05-27 RX ADMIN — Medication 2 MILLIGRAM(S): at 21:17

## 2019-05-27 RX ADMIN — Medication 20 MILLIGRAM(S): at 13:58

## 2019-05-27 RX ADMIN — Medication 20 MILLIGRAM(S): at 08:50

## 2019-05-27 RX ADMIN — RISPERIDONE 2 MILLIGRAM(S): 4 TABLET ORAL at 20:04

## 2019-05-27 RX ADMIN — Medication 1 MILLIGRAM(S): at 20:04

## 2019-05-27 RX ADMIN — RISPERIDONE 2 MILLIGRAM(S): 4 TABLET ORAL at 08:50

## 2019-05-27 RX ADMIN — Medication 0.25 MILLIGRAM(S): at 08:50

## 2019-05-27 RX ADMIN — LORATADINE 10 MILLIGRAM(S): 10 TABLET ORAL at 08:50

## 2019-05-27 RX ADMIN — RISPERIDONE 2 MILLIGRAM(S): 4 TABLET ORAL at 13:58

## 2019-05-27 RX ADMIN — Medication 50 MILLIGRAM(S): at 08:50

## 2019-05-27 RX ADMIN — Medication 50 MILLIGRAM(S): at 20:04

## 2019-05-28 PROCEDURE — 99232 SBSQ HOSP IP/OBS MODERATE 35: CPT

## 2019-05-28 RX ORDER — DIPHENHYDRAMINE HCL 50 MG
50 CAPSULE ORAL ONCE
Refills: 0 | Status: DISCONTINUED | OUTPATIENT
Start: 2019-05-28 | End: 2019-05-29

## 2019-05-28 RX ORDER — TOPIRAMATE 25 MG
75 TABLET ORAL
Refills: 0 | Status: DISCONTINUED | OUTPATIENT
Start: 2019-05-28 | End: 2019-05-29

## 2019-05-28 RX ORDER — HALOPERIDOL DECANOATE 100 MG/ML
7.5 INJECTION INTRAMUSCULAR ONCE
Refills: 0 | Status: COMPLETED | OUTPATIENT
Start: 2019-05-28 | End: 2019-05-28

## 2019-05-28 RX ORDER — HALOPERIDOL DECANOATE 100 MG/ML
7 INJECTION INTRAMUSCULAR ONCE
Refills: 0 | Status: DISCONTINUED | OUTPATIENT
Start: 2019-05-28 | End: 2019-05-28

## 2019-05-28 RX ADMIN — RISPERIDONE 2 MILLIGRAM(S): 4 TABLET ORAL at 13:24

## 2019-05-28 RX ADMIN — NORETHINDRONE AND ETHINYL ESTRADIOL 1 TABLET(S): KIT at 08:42

## 2019-05-28 RX ADMIN — Medication 50 MILLIGRAM(S): at 08:42

## 2019-05-28 RX ADMIN — Medication 1 MILLIGRAM(S): at 21:31

## 2019-05-28 RX ADMIN — Medication 2 MILLIGRAM(S): at 19:35

## 2019-05-28 RX ADMIN — Medication 20 MILLIGRAM(S): at 08:42

## 2019-05-28 RX ADMIN — RISPERIDONE 2 MILLIGRAM(S): 4 TABLET ORAL at 21:31

## 2019-05-28 RX ADMIN — LORATADINE 10 MILLIGRAM(S): 10 TABLET ORAL at 08:42

## 2019-05-28 RX ADMIN — Medication 0.25 MILLIGRAM(S): at 08:42

## 2019-05-28 RX ADMIN — HALOPERIDOL DECANOATE 7.5 MILLIGRAM(S): 100 INJECTION INTRAMUSCULAR at 17:17

## 2019-05-28 RX ADMIN — Medication 75 MILLIGRAM(S): at 21:32

## 2019-05-28 RX ADMIN — RISPERIDONE 2 MILLIGRAM(S): 4 TABLET ORAL at 08:42

## 2019-05-28 RX ADMIN — Medication 20 MILLIGRAM(S): at 13:24

## 2019-05-28 RX ADMIN — Medication 2 MILLIGRAM(S): at 17:17

## 2019-05-29 PROCEDURE — 99232 SBSQ HOSP IP/OBS MODERATE 35: CPT

## 2019-05-29 RX ORDER — CLONAZEPAM 1 MG
1 TABLET ORAL THREE TIMES A DAY
Refills: 0 | Status: DISCONTINUED | OUTPATIENT
Start: 2019-05-29 | End: 2019-05-30

## 2019-05-29 RX ORDER — TOPIRAMATE 25 MG
100 TABLET ORAL
Refills: 0 | Status: DISCONTINUED | OUTPATIENT
Start: 2019-05-29 | End: 2019-05-30

## 2019-05-29 RX ORDER — CHLORPROMAZINE HCL 10 MG
100 TABLET ORAL ONCE
Refills: 0 | Status: COMPLETED | OUTPATIENT
Start: 2019-05-29 | End: 2019-05-29

## 2019-05-29 RX ORDER — CHLORPROMAZINE HCL 10 MG
100 TABLET ORAL EVERY 4 HOURS
Refills: 0 | Status: DISCONTINUED | OUTPATIENT
Start: 2019-05-29 | End: 2019-06-10

## 2019-05-29 RX ORDER — DIPHENHYDRAMINE HCL 50 MG
50 CAPSULE ORAL ONCE
Refills: 0 | Status: DISCONTINUED | OUTPATIENT
Start: 2019-05-29 | End: 2019-05-29

## 2019-05-29 RX ORDER — HALOPERIDOL DECANOATE 100 MG/ML
5 INJECTION INTRAMUSCULAR ONCE
Refills: 0 | Status: COMPLETED | OUTPATIENT
Start: 2019-05-29 | End: 2019-05-29

## 2019-05-29 RX ORDER — HALOPERIDOL DECANOATE 100 MG/ML
5 INJECTION INTRAMUSCULAR ONCE
Refills: 0 | Status: DISCONTINUED | OUTPATIENT
Start: 2019-05-29 | End: 2019-05-29

## 2019-05-29 RX ADMIN — LORATADINE 10 MILLIGRAM(S): 10 TABLET ORAL at 08:57

## 2019-05-29 RX ADMIN — Medication 0.25 MILLIGRAM(S): at 08:57

## 2019-05-29 RX ADMIN — Medication 20 MILLIGRAM(S): at 13:22

## 2019-05-29 RX ADMIN — Medication 20 MILLIGRAM(S): at 08:57

## 2019-05-29 RX ADMIN — Medication 100 MILLIGRAM(S): at 14:05

## 2019-05-29 RX ADMIN — RISPERIDONE 2 MILLIGRAM(S): 4 TABLET ORAL at 13:22

## 2019-05-29 RX ADMIN — Medication 1 MILLIGRAM(S): at 22:10

## 2019-05-29 RX ADMIN — Medication 2 MILLIGRAM(S): at 09:45

## 2019-05-29 RX ADMIN — HALOPERIDOL DECANOATE 5 MILLIGRAM(S): 100 INJECTION INTRAMUSCULAR at 09:46

## 2019-05-29 RX ADMIN — Medication 1 MILLIGRAM(S): at 13:22

## 2019-05-29 RX ADMIN — RISPERIDONE 2 MILLIGRAM(S): 4 TABLET ORAL at 08:57

## 2019-05-29 RX ADMIN — Medication 75 MILLIGRAM(S): at 08:57

## 2019-05-29 RX ADMIN — Medication 100 MILLIGRAM(S): at 22:10

## 2019-05-29 RX ADMIN — RISPERIDONE 2 MILLIGRAM(S): 4 TABLET ORAL at 22:10

## 2019-05-29 RX ADMIN — NORETHINDRONE AND ETHINYL ESTRADIOL 1 TABLET(S): KIT at 08:57

## 2019-05-29 RX ADMIN — Medication 2 MILLIGRAM(S): at 14:05

## 2019-05-30 PROCEDURE — 99232 SBSQ HOSP IP/OBS MODERATE 35: CPT

## 2019-05-30 RX ORDER — TOPIRAMATE 25 MG
75 TABLET ORAL DAILY
Refills: 0 | Status: DISCONTINUED | OUTPATIENT
Start: 2019-05-30 | End: 2019-06-10

## 2019-05-30 RX ORDER — CLONAZEPAM 1 MG
0.5 TABLET ORAL
Refills: 0 | Status: DISCONTINUED | OUTPATIENT
Start: 2019-05-30 | End: 2019-06-06

## 2019-05-30 RX ORDER — TOPIRAMATE 25 MG
100 TABLET ORAL AT BEDTIME
Refills: 0 | Status: DISCONTINUED | OUTPATIENT
Start: 2019-05-30 | End: 2019-06-10

## 2019-05-30 RX ORDER — CLONAZEPAM 1 MG
1 TABLET ORAL AT BEDTIME
Refills: 0 | Status: DISCONTINUED | OUTPATIENT
Start: 2019-05-30 | End: 2019-06-06

## 2019-05-30 RX ADMIN — Medication 20 MILLIGRAM(S): at 13:46

## 2019-05-30 RX ADMIN — NORETHINDRONE AND ETHINYL ESTRADIOL 1 TABLET(S): KIT at 08:38

## 2019-05-30 RX ADMIN — RISPERIDONE 2 MILLIGRAM(S): 4 TABLET ORAL at 21:49

## 2019-05-30 RX ADMIN — Medication 1 MILLIGRAM(S): at 08:37

## 2019-05-30 RX ADMIN — Medication 1 MILLIGRAM(S): at 21:48

## 2019-05-30 RX ADMIN — RISPERIDONE 2 MILLIGRAM(S): 4 TABLET ORAL at 08:38

## 2019-05-30 RX ADMIN — Medication 5 MILLIGRAM(S): at 21:49

## 2019-05-30 RX ADMIN — Medication 100 MILLIGRAM(S): at 08:38

## 2019-05-30 RX ADMIN — LORATADINE 10 MILLIGRAM(S): 10 TABLET ORAL at 08:38

## 2019-05-30 RX ADMIN — Medication 1 MILLIGRAM(S): at 15:27

## 2019-05-30 RX ADMIN — Medication 20 MILLIGRAM(S): at 08:38

## 2019-05-30 RX ADMIN — Medication 100 MILLIGRAM(S): at 21:49

## 2019-05-30 RX ADMIN — RISPERIDONE 2 MILLIGRAM(S): 4 TABLET ORAL at 13:46

## 2019-05-31 PROCEDURE — 90832 PSYTX W PT 30 MINUTES: CPT | Mod: 59

## 2019-05-31 PROCEDURE — 90853 GROUP PSYCHOTHERAPY: CPT

## 2019-05-31 PROCEDURE — 99232 SBSQ HOSP IP/OBS MODERATE 35: CPT | Mod: 25

## 2019-05-31 RX ORDER — DIPHENHYDRAMINE HCL 50 MG
50 CAPSULE ORAL ONCE
Refills: 0 | Status: COMPLETED | OUTPATIENT
Start: 2019-05-31 | End: 2019-05-31

## 2019-05-31 RX ADMIN — Medication 1 MILLIGRAM(S): at 21:42

## 2019-05-31 RX ADMIN — Medication 5 MILLIGRAM(S): at 21:25

## 2019-05-31 RX ADMIN — Medication 0.5 MILLIGRAM(S): at 09:00

## 2019-05-31 RX ADMIN — Medication 75 MILLIGRAM(S): at 09:00

## 2019-05-31 RX ADMIN — Medication 100 MILLIGRAM(S): at 21:42

## 2019-05-31 RX ADMIN — RISPERIDONE 2 MILLIGRAM(S): 4 TABLET ORAL at 09:00

## 2019-05-31 RX ADMIN — RISPERIDONE 2 MILLIGRAM(S): 4 TABLET ORAL at 21:42

## 2019-05-31 RX ADMIN — RISPERIDONE 2 MILLIGRAM(S): 4 TABLET ORAL at 12:48

## 2019-05-31 RX ADMIN — Medication 20 MILLIGRAM(S): at 12:48

## 2019-05-31 RX ADMIN — Medication 20 MILLIGRAM(S): at 09:00

## 2019-05-31 RX ADMIN — NORETHINDRONE AND ETHINYL ESTRADIOL 1 TABLET(S): KIT at 09:00

## 2019-05-31 RX ADMIN — LORATADINE 10 MILLIGRAM(S): 10 TABLET ORAL at 09:00

## 2019-05-31 RX ADMIN — Medication 0.5 MILLIGRAM(S): at 12:48

## 2019-06-01 PROCEDURE — 99231 SBSQ HOSP IP/OBS SF/LOW 25: CPT

## 2019-06-01 RX ORDER — TRAZODONE HCL 50 MG
50 TABLET ORAL ONCE
Refills: 0 | Status: DISCONTINUED | OUTPATIENT
Start: 2019-06-01 | End: 2019-06-10

## 2019-06-01 RX ORDER — PROPRANOLOL HCL 160 MG
20 CAPSULE, EXTENDED RELEASE 24HR ORAL
Refills: 0 | Status: DISCONTINUED | OUTPATIENT
Start: 2019-06-01 | End: 2019-06-10

## 2019-06-01 RX ADMIN — Medication 0.5 MILLIGRAM(S): at 13:08

## 2019-06-01 RX ADMIN — Medication 100 MILLIGRAM(S): at 20:22

## 2019-06-01 RX ADMIN — RISPERIDONE 2 MILLIGRAM(S): 4 TABLET ORAL at 13:08

## 2019-06-01 RX ADMIN — RISPERIDONE 2 MILLIGRAM(S): 4 TABLET ORAL at 20:22

## 2019-06-01 RX ADMIN — LORATADINE 10 MILLIGRAM(S): 10 TABLET ORAL at 09:32

## 2019-06-01 RX ADMIN — Medication 20 MILLIGRAM(S): at 20:22

## 2019-06-01 RX ADMIN — Medication 75 MILLIGRAM(S): at 09:32

## 2019-06-01 RX ADMIN — Medication 2 MILLIGRAM(S): at 16:56

## 2019-06-01 RX ADMIN — Medication 0.5 MILLIGRAM(S): at 09:32

## 2019-06-01 RX ADMIN — NORETHINDRONE AND ETHINYL ESTRADIOL 1 TABLET(S): KIT at 09:32

## 2019-06-01 RX ADMIN — Medication 1 MILLIGRAM(S): at 20:22

## 2019-06-01 RX ADMIN — Medication 20 MILLIGRAM(S): at 09:32

## 2019-06-01 RX ADMIN — Medication 20 MILLIGRAM(S): at 13:08

## 2019-06-01 RX ADMIN — RISPERIDONE 2 MILLIGRAM(S): 4 TABLET ORAL at 09:32

## 2019-06-01 RX ADMIN — Medication 100 MILLIGRAM(S): at 16:56

## 2019-06-02 PROCEDURE — 99232 SBSQ HOSP IP/OBS MODERATE 35: CPT

## 2019-06-02 RX ADMIN — Medication 20 MILLIGRAM(S): at 08:53

## 2019-06-02 RX ADMIN — Medication 20 MILLIGRAM(S): at 13:49

## 2019-06-02 RX ADMIN — Medication 1 MILLIGRAM(S): at 22:10

## 2019-06-02 RX ADMIN — Medication 0.5 MILLIGRAM(S): at 08:53

## 2019-06-02 RX ADMIN — Medication 0.5 MILLIGRAM(S): at 13:49

## 2019-06-02 RX ADMIN — LORATADINE 10 MILLIGRAM(S): 10 TABLET ORAL at 08:53

## 2019-06-02 RX ADMIN — Medication 20 MILLIGRAM(S): at 22:10

## 2019-06-02 RX ADMIN — RISPERIDONE 2 MILLIGRAM(S): 4 TABLET ORAL at 22:10

## 2019-06-02 RX ADMIN — Medication 75 MILLIGRAM(S): at 08:53

## 2019-06-02 RX ADMIN — NORETHINDRONE AND ETHINYL ESTRADIOL 1 TABLET(S): KIT at 08:53

## 2019-06-02 RX ADMIN — RISPERIDONE 2 MILLIGRAM(S): 4 TABLET ORAL at 08:53

## 2019-06-02 RX ADMIN — RISPERIDONE 2 MILLIGRAM(S): 4 TABLET ORAL at 13:49

## 2019-06-02 RX ADMIN — Medication 100 MILLIGRAM(S): at 22:10

## 2019-06-03 PROCEDURE — 99232 SBSQ HOSP IP/OBS MODERATE 35: CPT

## 2019-06-03 RX ADMIN — Medication 1 MILLIGRAM(S): at 21:26

## 2019-06-03 RX ADMIN — RISPERIDONE 2 MILLIGRAM(S): 4 TABLET ORAL at 08:48

## 2019-06-03 RX ADMIN — Medication 0.5 MILLIGRAM(S): at 08:47

## 2019-06-03 RX ADMIN — Medication 100 MILLIGRAM(S): at 21:27

## 2019-06-03 RX ADMIN — NORETHINDRONE AND ETHINYL ESTRADIOL 1 TABLET(S): KIT at 08:47

## 2019-06-03 RX ADMIN — Medication 20 MILLIGRAM(S): at 21:27

## 2019-06-03 RX ADMIN — LORATADINE 10 MILLIGRAM(S): 10 TABLET ORAL at 08:47

## 2019-06-03 RX ADMIN — RISPERIDONE 2 MILLIGRAM(S): 4 TABLET ORAL at 21:27

## 2019-06-03 RX ADMIN — Medication 0.5 MILLIGRAM(S): at 12:47

## 2019-06-03 RX ADMIN — Medication 20 MILLIGRAM(S): at 08:48

## 2019-06-03 RX ADMIN — Medication 75 MILLIGRAM(S): at 08:48

## 2019-06-03 RX ADMIN — Medication 20 MILLIGRAM(S): at 12:47

## 2019-06-03 RX ADMIN — RISPERIDONE 2 MILLIGRAM(S): 4 TABLET ORAL at 12:47

## 2019-06-04 PROCEDURE — 99232 SBSQ HOSP IP/OBS MODERATE 35: CPT

## 2019-06-04 RX ADMIN — Medication 20 MILLIGRAM(S): at 09:20

## 2019-06-04 RX ADMIN — LORATADINE 10 MILLIGRAM(S): 10 TABLET ORAL at 09:20

## 2019-06-04 RX ADMIN — Medication 1 MILLIGRAM(S): at 20:54

## 2019-06-04 RX ADMIN — NORETHINDRONE AND ETHINYL ESTRADIOL 1 TABLET(S): KIT at 09:20

## 2019-06-04 RX ADMIN — Medication 75 MILLIGRAM(S): at 09:20

## 2019-06-04 RX ADMIN — Medication 100 MILLIGRAM(S): at 20:54

## 2019-06-04 RX ADMIN — Medication 0.5 MILLIGRAM(S): at 13:08

## 2019-06-04 RX ADMIN — Medication 20 MILLIGRAM(S): at 13:08

## 2019-06-04 RX ADMIN — RISPERIDONE 2 MILLIGRAM(S): 4 TABLET ORAL at 20:54

## 2019-06-04 RX ADMIN — Medication 0.5 MILLIGRAM(S): at 09:20

## 2019-06-04 RX ADMIN — Medication 20 MILLIGRAM(S): at 20:54

## 2019-06-04 RX ADMIN — RISPERIDONE 2 MILLIGRAM(S): 4 TABLET ORAL at 13:08

## 2019-06-04 RX ADMIN — RISPERIDONE 2 MILLIGRAM(S): 4 TABLET ORAL at 09:20

## 2019-06-05 PROCEDURE — 99232 SBSQ HOSP IP/OBS MODERATE 35: CPT

## 2019-06-05 RX ADMIN — Medication 100 MILLIGRAM(S): at 20:17

## 2019-06-05 RX ADMIN — Medication 75 MILLIGRAM(S): at 08:37

## 2019-06-05 RX ADMIN — Medication 2 MILLIGRAM(S): at 18:26

## 2019-06-05 RX ADMIN — RISPERIDONE 2 MILLIGRAM(S): 4 TABLET ORAL at 20:17

## 2019-06-05 RX ADMIN — RISPERIDONE 2 MILLIGRAM(S): 4 TABLET ORAL at 12:51

## 2019-06-05 RX ADMIN — Medication 20 MILLIGRAM(S): at 08:37

## 2019-06-05 RX ADMIN — LORATADINE 10 MILLIGRAM(S): 10 TABLET ORAL at 08:37

## 2019-06-05 RX ADMIN — NORETHINDRONE AND ETHINYL ESTRADIOL 1 TABLET(S): KIT at 08:37

## 2019-06-05 RX ADMIN — Medication 0.5 MILLIGRAM(S): at 12:51

## 2019-06-05 RX ADMIN — Medication 0.5 MILLIGRAM(S): at 08:49

## 2019-06-05 RX ADMIN — Medication 20 MILLIGRAM(S): at 12:51

## 2019-06-05 RX ADMIN — RISPERIDONE 2 MILLIGRAM(S): 4 TABLET ORAL at 08:37

## 2019-06-05 RX ADMIN — Medication 20 MILLIGRAM(S): at 20:17

## 2019-06-05 RX ADMIN — Medication 1 MILLIGRAM(S): at 20:17

## 2019-06-06 PROCEDURE — 99232 SBSQ HOSP IP/OBS MODERATE 35: CPT

## 2019-06-06 RX ORDER — TOPIRAMATE 25 MG
1 TABLET ORAL
Qty: 30 | Refills: 0
Start: 2019-06-06 | End: 2019-07-05

## 2019-06-06 RX ORDER — RISPERIDONE 4 MG/1
1 TABLET ORAL
Qty: 90 | Refills: 0
Start: 2019-06-06 | End: 2019-07-05

## 2019-06-06 RX ORDER — CLONAZEPAM 1 MG
0.5 TABLET ORAL
Refills: 0 | Status: DISCONTINUED | OUTPATIENT
Start: 2019-06-06 | End: 2019-06-10

## 2019-06-06 RX ORDER — TOPIRAMATE 25 MG
3 TABLET ORAL
Qty: 90 | Refills: 0
Start: 2019-06-06 | End: 2019-07-05

## 2019-06-06 RX ORDER — CLONAZEPAM 1 MG
1 TABLET ORAL
Qty: 30 | Refills: 0
Start: 2019-06-06 | End: 2019-07-05

## 2019-06-06 RX ORDER — CLONAZEPAM 1 MG
1 TABLET ORAL
Qty: 60 | Refills: 0
Start: 2019-06-06 | End: 2019-07-05

## 2019-06-06 RX ORDER — CLONAZEPAM 1 MG
1 TABLET ORAL AT BEDTIME
Refills: 0 | Status: DISCONTINUED | OUTPATIENT
Start: 2019-06-07 | End: 2019-06-10

## 2019-06-06 RX ORDER — PROPRANOLOL HCL 160 MG
1 CAPSULE, EXTENDED RELEASE 24HR ORAL
Qty: 90 | Refills: 0
Start: 2019-06-06 | End: 2019-07-05

## 2019-06-06 RX ADMIN — Medication 75 MILLIGRAM(S): at 09:18

## 2019-06-06 RX ADMIN — Medication 100 MILLIGRAM(S): at 17:23

## 2019-06-06 RX ADMIN — LORATADINE 10 MILLIGRAM(S): 10 TABLET ORAL at 09:18

## 2019-06-06 RX ADMIN — Medication 0.5 MILLIGRAM(S): at 13:55

## 2019-06-06 RX ADMIN — Medication 20 MILLIGRAM(S): at 19:04

## 2019-06-06 RX ADMIN — Medication 20 MILLIGRAM(S): at 09:18

## 2019-06-06 RX ADMIN — RISPERIDONE 2 MILLIGRAM(S): 4 TABLET ORAL at 09:18

## 2019-06-06 RX ADMIN — RISPERIDONE 2 MILLIGRAM(S): 4 TABLET ORAL at 19:04

## 2019-06-06 RX ADMIN — Medication 0.5 MILLIGRAM(S): at 09:18

## 2019-06-06 RX ADMIN — Medication 100 MILLIGRAM(S): at 19:04

## 2019-06-06 RX ADMIN — Medication 20 MILLIGRAM(S): at 13:55

## 2019-06-06 RX ADMIN — RISPERIDONE 2 MILLIGRAM(S): 4 TABLET ORAL at 13:55

## 2019-06-06 RX ADMIN — NORETHINDRONE AND ETHINYL ESTRADIOL 1 TABLET(S): KIT at 09:18

## 2019-06-06 RX ADMIN — Medication 2 MILLIGRAM(S): at 17:23

## 2019-06-06 RX ADMIN — Medication 1 MILLIGRAM(S): at 19:04

## 2019-06-07 PROCEDURE — 99232 SBSQ HOSP IP/OBS MODERATE 35: CPT

## 2019-06-07 PROCEDURE — 90832 PSYTX W PT 30 MINUTES: CPT

## 2019-06-07 RX ORDER — CHLORPROMAZINE HCL 10 MG
50 TABLET ORAL ONCE
Refills: 0 | Status: COMPLETED | OUTPATIENT
Start: 2019-06-07 | End: 2019-06-07

## 2019-06-07 RX ORDER — CHLORPROMAZINE HCL 10 MG
50 TABLET ORAL ONCE
Refills: 0 | Status: DISCONTINUED | OUTPATIENT
Start: 2019-06-07 | End: 2019-06-10

## 2019-06-07 RX ADMIN — Medication 50 MILLIGRAM(S): at 09:40

## 2019-06-07 RX ADMIN — Medication 20 MILLIGRAM(S): at 13:14

## 2019-06-07 RX ADMIN — RISPERIDONE 2 MILLIGRAM(S): 4 TABLET ORAL at 21:30

## 2019-06-07 RX ADMIN — NORETHINDRONE AND ETHINYL ESTRADIOL 1 TABLET(S): KIT at 08:57

## 2019-06-07 RX ADMIN — Medication 2 MILLIGRAM(S): at 09:41

## 2019-06-07 RX ADMIN — Medication 0.5 MILLIGRAM(S): at 13:14

## 2019-06-07 RX ADMIN — Medication 100 MILLIGRAM(S): at 21:30

## 2019-06-07 RX ADMIN — Medication 2 MILLIGRAM(S): at 09:08

## 2019-06-07 RX ADMIN — LORATADINE 10 MILLIGRAM(S): 10 TABLET ORAL at 08:57

## 2019-06-07 RX ADMIN — Medication 20 MILLIGRAM(S): at 08:57

## 2019-06-07 RX ADMIN — Medication 20 MILLIGRAM(S): at 21:30

## 2019-06-07 RX ADMIN — RISPERIDONE 2 MILLIGRAM(S): 4 TABLET ORAL at 13:14

## 2019-06-07 RX ADMIN — RISPERIDONE 2 MILLIGRAM(S): 4 TABLET ORAL at 08:57

## 2019-06-07 RX ADMIN — Medication 0.5 MILLIGRAM(S): at 08:57

## 2019-06-07 RX ADMIN — Medication 75 MILLIGRAM(S): at 08:57

## 2019-06-07 RX ADMIN — Medication 1 MILLIGRAM(S): at 21:30

## 2019-06-08 RX ADMIN — Medication 0.5 MILLIGRAM(S): at 09:37

## 2019-06-08 RX ADMIN — NORETHINDRONE AND ETHINYL ESTRADIOL 1 TABLET(S): KIT at 09:37

## 2019-06-08 RX ADMIN — LORATADINE 10 MILLIGRAM(S): 10 TABLET ORAL at 09:37

## 2019-06-08 RX ADMIN — Medication 20 MILLIGRAM(S): at 09:37

## 2019-06-08 RX ADMIN — RISPERIDONE 2 MILLIGRAM(S): 4 TABLET ORAL at 09:37

## 2019-06-08 RX ADMIN — Medication 1 MILLIGRAM(S): at 20:49

## 2019-06-08 RX ADMIN — Medication 75 MILLIGRAM(S): at 09:37

## 2019-06-08 RX ADMIN — Medication 20 MILLIGRAM(S): at 12:30

## 2019-06-08 RX ADMIN — Medication 100 MILLIGRAM(S): at 20:49

## 2019-06-08 RX ADMIN — RISPERIDONE 2 MILLIGRAM(S): 4 TABLET ORAL at 20:49

## 2019-06-08 RX ADMIN — Medication 0.5 MILLIGRAM(S): at 12:30

## 2019-06-08 RX ADMIN — Medication 20 MILLIGRAM(S): at 20:49

## 2019-06-08 RX ADMIN — RISPERIDONE 2 MILLIGRAM(S): 4 TABLET ORAL at 12:30

## 2019-06-09 RX ADMIN — RISPERIDONE 2 MILLIGRAM(S): 4 TABLET ORAL at 09:04

## 2019-06-09 RX ADMIN — Medication 75 MILLIGRAM(S): at 09:04

## 2019-06-09 RX ADMIN — Medication 20 MILLIGRAM(S): at 21:18

## 2019-06-09 RX ADMIN — Medication 100 MILLIGRAM(S): at 21:19

## 2019-06-09 RX ADMIN — LORATADINE 10 MILLIGRAM(S): 10 TABLET ORAL at 09:04

## 2019-06-09 RX ADMIN — Medication 0.5 MILLIGRAM(S): at 09:04

## 2019-06-09 RX ADMIN — Medication 1 MILLIGRAM(S): at 21:19

## 2019-06-09 RX ADMIN — Medication 20 MILLIGRAM(S): at 13:19

## 2019-06-09 RX ADMIN — Medication 20 MILLIGRAM(S): at 09:04

## 2019-06-09 RX ADMIN — RISPERIDONE 2 MILLIGRAM(S): 4 TABLET ORAL at 13:19

## 2019-06-09 RX ADMIN — NORETHINDRONE AND ETHINYL ESTRADIOL 1 TABLET(S): KIT at 09:04

## 2019-06-09 RX ADMIN — Medication 0.5 MILLIGRAM(S): at 13:18

## 2019-06-09 RX ADMIN — RISPERIDONE 2 MILLIGRAM(S): 4 TABLET ORAL at 21:19

## 2019-06-10 VITALS — TEMPERATURE: 97 F | SYSTOLIC BLOOD PRESSURE: 112 MMHG | HEART RATE: 76 BPM | DIASTOLIC BLOOD PRESSURE: 78 MMHG

## 2019-06-10 PROCEDURE — 90832 PSYTX W PT 30 MINUTES: CPT

## 2019-06-10 PROCEDURE — 99238 HOSP IP/OBS DSCHRG MGMT 30/<: CPT

## 2019-06-10 RX ORDER — NORETHINDRONE AND ETHINYL ESTRADIOL 0.4-0.035
1 KIT ORAL
Qty: 0 | Refills: 0 | DISCHARGE
Start: 2019-06-10

## 2019-06-10 RX ORDER — LORATADINE 10 MG/1
1 TABLET ORAL
Qty: 0 | Refills: 0 | DISCHARGE
Start: 2019-06-10

## 2019-06-10 RX ORDER — CLONAZEPAM 1 MG
1 TABLET ORAL
Qty: 0 | Refills: 0 | DISCHARGE
Start: 2019-06-10

## 2019-06-10 RX ORDER — TOPIRAMATE 25 MG
1 TABLET ORAL
Qty: 0 | Refills: 0 | DISCHARGE
Start: 2019-06-10

## 2019-06-10 RX ORDER — RISPERIDONE 4 MG/1
1 TABLET ORAL
Qty: 0 | Refills: 0 | DISCHARGE
Start: 2019-06-10

## 2019-06-10 RX ADMIN — LORATADINE 10 MILLIGRAM(S): 10 TABLET ORAL at 09:43

## 2019-06-10 RX ADMIN — Medication 75 MILLIGRAM(S): at 09:44

## 2019-06-10 RX ADMIN — Medication 20 MILLIGRAM(S): at 09:44

## 2019-06-10 RX ADMIN — RISPERIDONE 2 MILLIGRAM(S): 4 TABLET ORAL at 09:44

## 2019-06-10 RX ADMIN — NORETHINDRONE AND ETHINYL ESTRADIOL 1 TABLET(S): KIT at 09:43

## 2019-06-10 RX ADMIN — Medication 0.5 MILLIGRAM(S): at 09:43

## 2019-12-29 ENCOUNTER — EMERGENCY (EMERGENCY)
Facility: HOSPITAL | Age: 24
LOS: 1 days | Discharge: LEFT BEFORE DISCHARGE | End: 2019-12-29
Admitting: EMERGENCY MEDICINE
Payer: COMMERCIAL

## 2019-12-29 VITALS
TEMPERATURE: 98 F | DIASTOLIC BLOOD PRESSURE: 64 MMHG | OXYGEN SATURATION: 100 % | SYSTOLIC BLOOD PRESSURE: 106 MMHG | HEART RATE: 83 BPM | RESPIRATION RATE: 18 BRPM

## 2019-12-29 PROCEDURE — L9991: CPT

## 2019-12-29 NOTE — ED ADULT TRIAGE NOTE - CHIEF COMPLAINT QUOTE
pt c/o pain left ab d area . no n,v,  4/10  pt admits to having diarrhia for 3 months and being worked up

## 2020-01-01 ENCOUNTER — OUTPATIENT (OUTPATIENT)
Dept: OUTPATIENT SERVICES | Facility: HOSPITAL | Age: 25
LOS: 1 days | End: 2020-01-01
Payer: COMMERCIAL

## 2020-01-01 PROCEDURE — G9001: CPT

## 2020-01-07 DIAGNOSIS — Z71.89 OTHER SPECIFIED COUNSELING: ICD-10-CM

## 2020-02-14 ENCOUNTER — INPATIENT (INPATIENT)
Facility: HOSPITAL | Age: 25
LOS: 9 days | Discharge: HOME | End: 2020-02-24
Attending: PSYCHIATRY & NEUROLOGY | Admitting: PSYCHIATRY & NEUROLOGY
Payer: COMMERCIAL

## 2020-02-14 VITALS — TEMPERATURE: 98 F | WEIGHT: 134.04 LBS

## 2020-02-14 DIAGNOSIS — F31.30 BIPOLAR DISORDER, CURRENT EPISODE DEPRESSED, MILD OR MODERATE SEVERITY, UNSPECIFIED: ICD-10-CM

## 2020-02-14 PROCEDURE — 99222 1ST HOSP IP/OBS MODERATE 55: CPT

## 2020-02-14 RX ORDER — DULOXETINE HYDROCHLORIDE 30 MG/1
20 CAPSULE, DELAYED RELEASE ORAL DAILY
Refills: 0 | Status: DISCONTINUED | OUTPATIENT
Start: 2020-02-14 | End: 2020-02-16

## 2020-02-14 RX ORDER — DIPHENHYDRAMINE HCL 50 MG
50 CAPSULE ORAL ONCE
Refills: 0 | Status: DISCONTINUED | OUTPATIENT
Start: 2020-02-14 | End: 2020-02-24

## 2020-02-14 RX ORDER — HALOPERIDOL DECANOATE 100 MG/ML
5 INJECTION INTRAMUSCULAR EVERY 4 HOURS
Refills: 0 | Status: DISCONTINUED | OUTPATIENT
Start: 2020-02-14 | End: 2020-02-24

## 2020-02-14 RX ORDER — CLONAZEPAM 1 MG
1 TABLET ORAL
Refills: 0 | Status: DISCONTINUED | OUTPATIENT
Start: 2020-02-14 | End: 2020-02-20

## 2020-02-14 RX ORDER — TOPIRAMATE 25 MG
200 TABLET ORAL DAILY
Refills: 0 | Status: DISCONTINUED | OUTPATIENT
Start: 2020-02-14 | End: 2020-02-24

## 2020-02-14 RX ORDER — DIPHENHYDRAMINE HCL 50 MG
50 CAPSULE ORAL EVERY 4 HOURS
Refills: 0 | Status: DISCONTINUED | OUTPATIENT
Start: 2020-02-14 | End: 2020-02-24

## 2020-02-14 RX ORDER — RISPERIDONE 4 MG/1
2 TABLET ORAL AT BEDTIME
Refills: 0 | Status: DISCONTINUED | OUTPATIENT
Start: 2020-02-14 | End: 2020-02-24

## 2020-02-14 RX ORDER — RISPERIDONE 4 MG/1
4 TABLET ORAL DAILY
Refills: 0 | Status: DISCONTINUED | OUTPATIENT
Start: 2020-02-14 | End: 2020-02-24

## 2020-02-14 RX ORDER — HALOPERIDOL DECANOATE 100 MG/ML
5 INJECTION INTRAMUSCULAR ONCE
Refills: 0 | Status: DISCONTINUED | OUTPATIENT
Start: 2020-02-14 | End: 2020-02-24

## 2020-02-14 RX ADMIN — RISPERIDONE 2 MILLIGRAM(S): 4 TABLET ORAL at 21:06

## 2020-02-14 RX ADMIN — Medication 1 MILLIGRAM(S): at 21:05

## 2020-02-14 NOTE — CHART NOTE - NSCHARTNOTEFT_GEN_A_CORE
Screening Medical Evaluation  Patient Admitted from:     [facility] admitting diagnosis:    PAST MEDICAL & SURGICAL HISTORY:  PCOS (polycystic ovarian syndrome)  ADHD (attention deficit hyperactivity disorder)  Bipolar 1 disorder  No significant past surgical history        Allergies    No Known Allergies    Intolerances        Social History:   Denies smoking, drug use, alcohol use.    FAMILY HISTORY:  No pertinent family history in first degree relatives      MEDICATIONS  (STANDING):  clonazePAM  Tablet 1 milliGRAM(s) Oral two times a day  DULoxetine 20 milliGRAM(s) Oral daily  propranolol 20 milliGRAM(s) Oral two times a day  risperiDONE   Tablet 2 milliGRAM(s) Oral at bedtime  risperiDONE   Tablet 4 milliGRAM(s) Oral daily  topiramate 200 milliGRAM(s) Oral daily    MEDICATIONS  (PRN):  dicyclomine 20 milliGRAM(s) Oral three times a day before meals PRN IBS  diphenhydrAMINE 50 milliGRAM(s) Oral every 4 hours PRN aggression  diphenhydrAMINE   Injectable 50 milliGRAM(s) IntraMuscular once PRN severe aggression  haloperidol     Tablet 5 milliGRAM(s) Oral every 4 hours PRN aggression  haloperidol    Injectable 5 milliGRAM(s) IntraMuscular once PRN severe aggression  LORazepam     Tablet 2 milliGRAM(s) Oral every 6 hours PRN severe anxiety  LORazepam   Injectable 2 milliGRAM(s) IntraMuscular once PRN severe aggression      Vital Signs Last 24 Hrs  T(C): 36.4 (14 Feb 2020 20:33), Max: 36.4 (14 Feb 2020 17:05)  T(F): 97.5 (14 Feb 2020 20:33), Max: 97.6 (14 Feb 2020 17:05)  HR: 103 (14 Feb 2020 20:33) (103 - 103)  BP: 100/68 (14 Feb 2020 20:33) (100/68 - 100/68)    CAPILLARY BLOOD GLUCOSE            PHYSICAL EXAM:  GENERAL: NAD, well-developed  HEAD:  Atraumatic, Normocephalic  EYES: EOMI, PERRLA, conjunctiva and sclera clear  NECK: Supple, No JVD  CHEST/LUNG: Clear to auscultation bilaterally; No wheeze  HEART: Regular rate and rhythm; No murmurs, rubs, or gallops  ABDOMEN: Soft, Nontender, Nondistended; Bowel sounds present  EXTREMITIES:  2+ Peripheral Pulses, No clubbing, cyanosis, or edema  PSYCH: AAOx3  NEUROLOGY: non-focal  SKIN: No rashes or lesions. Healing laceration noted to right arm.    LABS:                    RADIOLOGY & ADDITIONAL TESTS:    Assessment and Plan: Screening Medical Evaluation  Patient Admitted from:     [facility] admitting diagnosis: Bipolar affective disorder, current episode mild or moderate depression    PAST MEDICAL & SURGICAL HISTORY:  PCOS (polycystic ovarian syndrome)  ADHD (attention deficit hyperactivity disorder)  Bipolar 1 disorder  No significant past surgical history        Allergies    No Known Allergies    Intolerances        Social History:   Denies smoking, drug use, alcohol use.    FAMILY HISTORY:  No pertinent family history in first degree relatives      MEDICATIONS  (STANDING):  clonazePAM  Tablet 1 milliGRAM(s) Oral two times a day  DULoxetine 20 milliGRAM(s) Oral daily  propranolol 20 milliGRAM(s) Oral two times a day  risperiDONE   Tablet 2 milliGRAM(s) Oral at bedtime  risperiDONE   Tablet 4 milliGRAM(s) Oral daily  topiramate 200 milliGRAM(s) Oral daily    MEDICATIONS  (PRN):  dicyclomine 20 milliGRAM(s) Oral three times a day before meals PRN IBS  diphenhydrAMINE 50 milliGRAM(s) Oral every 4 hours PRN aggression  diphenhydrAMINE   Injectable 50 milliGRAM(s) IntraMuscular once PRN severe aggression  haloperidol     Tablet 5 milliGRAM(s) Oral every 4 hours PRN aggression  haloperidol    Injectable 5 milliGRAM(s) IntraMuscular once PRN severe aggression  LORazepam     Tablet 2 milliGRAM(s) Oral every 6 hours PRN severe anxiety  LORazepam   Injectable 2 milliGRAM(s) IntraMuscular once PRN severe aggression      Vital Signs Last 24 Hrs  T(C): 36.4 (14 Feb 2020 20:33), Max: 36.4 (14 Feb 2020 17:05)  T(F): 97.5 (14 Feb 2020 20:33), Max: 97.6 (14 Feb 2020 17:05)  HR: 103 (14 Feb 2020 20:33) (103 - 103)  BP: 100/68 (14 Feb 2020 20:33) (100/68 - 100/68)    CAPILLARY BLOOD GLUCOSE            PHYSICAL EXAM:  GENERAL: NAD, well-developed  HEAD:  Atraumatic, Normocephalic  EYES: EOMI, PERRLA, conjunctiva and sclera clear  NECK: Supple, No JVD  CHEST/LUNG: Clear to auscultation bilaterally; No wheeze  HEART: Regular rate and rhythm; No murmurs, rubs, or gallops  ABDOMEN: Soft, Nontender, Nondistended; Bowel sounds present  EXTREMITIES:  2+ Peripheral Pulses, No clubbing, cyanosis, or edema  PSYCH: AAOx3  NEUROLOGY: non-focal  SKIN: No rashes or lesions. Healing laceration noted to right arm.    LABS:      Assessment and Plan:  25 y/o female with PMHx of PCOS, ADHD, Bipolar 1 disorder, IBS presents to [facility] with an admitting diagnosis of Bipolar affective disorder, current episode mild or moderate depression. Pt has no medical complaints at this time. Denies chest pain, SOB, palpitations, headache, dizziness, abdominal pain, N/V/D, dysuria, constipation. Screening Medical Evaluation  Patient Admitted from:     [facility] admitting diagnosis: Bipolar affective disorder, current episode mild or moderate depression    PAST MEDICAL & SURGICAL HISTORY:  PCOS (polycystic ovarian syndrome)  ADHD (attention deficit hyperactivity disorder)  Bipolar 1 disorder  No significant past surgical history        Allergies    No Known Allergies    Intolerances        Social History:   Denies smoking, drug use, alcohol use.    FAMILY HISTORY:  No pertinent family history in first degree relatives      MEDICATIONS  (STANDING):  clonazePAM  Tablet 1 milliGRAM(s) Oral two times a day  DULoxetine 20 milliGRAM(s) Oral daily  propranolol 20 milliGRAM(s) Oral two times a day  risperiDONE   Tablet 2 milliGRAM(s) Oral at bedtime  risperiDONE   Tablet 4 milliGRAM(s) Oral daily  topiramate 200 milliGRAM(s) Oral daily    MEDICATIONS  (PRN):  dicyclomine 20 milliGRAM(s) Oral three times a day before meals PRN IBS  diphenhydrAMINE 50 milliGRAM(s) Oral every 4 hours PRN aggression  diphenhydrAMINE   Injectable 50 milliGRAM(s) IntraMuscular once PRN severe aggression  haloperidol     Tablet 5 milliGRAM(s) Oral every 4 hours PRN aggression  haloperidol    Injectable 5 milliGRAM(s) IntraMuscular once PRN severe aggression  LORazepam     Tablet 2 milliGRAM(s) Oral every 6 hours PRN severe anxiety  LORazepam   Injectable 2 milliGRAM(s) IntraMuscular once PRN severe aggression      Vital Signs Last 24 Hrs  T(C): 36.4 (14 Feb 2020 20:33), Max: 36.4 (14 Feb 2020 17:05)  T(F): 97.5 (14 Feb 2020 20:33), Max: 97.6 (14 Feb 2020 17:05)  HR: 103 (14 Feb 2020 20:33) (103 - 103)  BP: 100/68 (14 Feb 2020 20:33) (100/68 - 100/68)    CAPILLARY BLOOD GLUCOSE            PHYSICAL EXAM:  GENERAL: NAD, well-developed  HEAD:  Atraumatic, Normocephalic  EYES: EOMI, PERRLA, conjunctiva and sclera clear  NECK: Supple, No JVD  CHEST/LUNG: Clear to auscultation bilaterally; No wheeze  HEART: Regular rate and rhythm; No murmurs, rubs, or gallops  ABDOMEN: Soft, Nontender, Nondistended; Bowel sounds present  EXTREMITIES:  2+ Peripheral Pulses, No clubbing, cyanosis, or edema  PSYCH: AAOx3  NEUROLOGY: non-focal  SKIN: No rashes or lesions. Healing laceration noted to right arm.    LABS:      Assessment and Plan:  23 y/o female with PMHx of PCOS, ADHD, Bipolar 1 disorder, IBS presents to [facility] with an admitting diagnosis of Bipolar affective disorder, current episode mild or moderate depression. Pt has no medical complaints at this time. Denies chest pain, SOB, palpitations, headache, dizziness, abdominal pain, N/V/D, dysuria, constipation.    1. Bipolar Disorder- continue with medications as per psychiatric team.    2. Irritable Bowel Syndrome- Screening Medical Evaluation  Patient Admitted from:     [facility] admitting diagnosis: Bipolar affective disorder, current episode mild or moderate depression    PAST MEDICAL & SURGICAL HISTORY:  PCOS (polycystic ovarian syndrome)  ADHD (attention deficit hyperactivity disorder)  Bipolar 1 disorder  No significant past surgical history        Allergies    No Known Allergies    Intolerances        Social History:   Denies smoking, drug use, alcohol use.    FAMILY HISTORY:  No pertinent family history in first degree relatives      MEDICATIONS  (STANDING):  clonazePAM  Tablet 1 milliGRAM(s) Oral two times a day  DULoxetine 20 milliGRAM(s) Oral daily  propranolol 20 milliGRAM(s) Oral two times a day  risperiDONE   Tablet 2 milliGRAM(s) Oral at bedtime  risperiDONE   Tablet 4 milliGRAM(s) Oral daily  topiramate 200 milliGRAM(s) Oral daily    MEDICATIONS  (PRN):  dicyclomine 20 milliGRAM(s) Oral three times a day before meals PRN IBS  diphenhydrAMINE 50 milliGRAM(s) Oral every 4 hours PRN aggression  diphenhydrAMINE   Injectable 50 milliGRAM(s) IntraMuscular once PRN severe aggression  haloperidol     Tablet 5 milliGRAM(s) Oral every 4 hours PRN aggression  haloperidol    Injectable 5 milliGRAM(s) IntraMuscular once PRN severe aggression  LORazepam     Tablet 2 milliGRAM(s) Oral every 6 hours PRN severe anxiety  LORazepam   Injectable 2 milliGRAM(s) IntraMuscular once PRN severe aggression      Vital Signs Last 24 Hrs  T(C): 36.4 (14 Feb 2020 20:33), Max: 36.4 (14 Feb 2020 17:05)  T(F): 97.5 (14 Feb 2020 20:33), Max: 97.6 (14 Feb 2020 17:05)  HR: 103 (14 Feb 2020 20:33) (103 - 103)  BP: 100/68 (14 Feb 2020 20:33) (100/68 - 100/68)    CAPILLARY BLOOD GLUCOSE            PHYSICAL EXAM:  GENERAL: NAD, well-developed  HEAD:  Atraumatic, Normocephalic  EYES: EOMI, PERRLA, conjunctiva and sclera clear  NECK: Supple, No JVD  CHEST/LUNG: Clear to auscultation bilaterally; No wheeze  HEART: Regular rate and rhythm; No murmurs, rubs, or gallops  ABDOMEN: Soft, Nontender, Nondistended; Bowel sounds present  EXTREMITIES:  2+ Peripheral Pulses, No clubbing, cyanosis, or edema  PSYCH: AAOx3  NEUROLOGY: non-focal  SKIN: No rashes or lesions. Healing laceration noted to right arm.    LABS:      Assessment and Plan:  23 y/o female with PMHx of PCOS, ADHD, Bipolar 1 disorder, IBS presents to [facility] with an admitting diagnosis of Bipolar affective disorder, current episode mild or moderate depression. Pt has no medical complaints at this time. Denies chest pain, SOB, palpitations, headache, dizziness, abdominal pain, N/V/D, dysuria, constipation.    1. Bipolar Disorder- continue with medications as per psychiatric team.    2. Irritable Bowel Syndrome- continue Dicyclomine 20mg TID. Screening Medical Evaluation  Patient Admitted from: [facility] admitting diagnosis: Bipolar affective disorder, current episode mild or moderate depression    PAST MEDICAL & SURGICAL HISTORY:  PCOS (polycystic ovarian syndrome)  ADHD (attention deficit hyperactivity disorder)  Bipolar 1 disorder  No significant past surgical history        Allergies    No Known Allergies    Intolerances        Social History:   Denies smoking, drug use, alcohol use.    FAMILY HISTORY:  No pertinent family history in first degree relatives      MEDICATIONS  (STANDING):  clonazePAM  Tablet 1 milliGRAM(s) Oral two times a day  DULoxetine 20 milliGRAM(s) Oral daily  propranolol 20 milliGRAM(s) Oral two times a day  risperiDONE   Tablet 2 milliGRAM(s) Oral at bedtime  risperiDONE   Tablet 4 milliGRAM(s) Oral daily  topiramate 200 milliGRAM(s) Oral daily    MEDICATIONS  (PRN):  dicyclomine 20 milliGRAM(s) Oral three times a day before meals PRN IBS  diphenhydrAMINE 50 milliGRAM(s) Oral every 4 hours PRN aggression  diphenhydrAMINE   Injectable 50 milliGRAM(s) IntraMuscular once PRN severe aggression  haloperidol     Tablet 5 milliGRAM(s) Oral every 4 hours PRN aggression  haloperidol    Injectable 5 milliGRAM(s) IntraMuscular once PRN severe aggression  LORazepam     Tablet 2 milliGRAM(s) Oral every 6 hours PRN severe anxiety  LORazepam   Injectable 2 milliGRAM(s) IntraMuscular once PRN severe aggression      Vital Signs Last 24 Hrs  T(C): 36.4 (14 Feb 2020 20:33), Max: 36.4 (14 Feb 2020 17:05)  T(F): 97.5 (14 Feb 2020 20:33), Max: 97.6 (14 Feb 2020 17:05)  HR: 103 (14 Feb 2020 20:33) (103 - 103)  BP: 100/68 (14 Feb 2020 20:33) (100/68 - 100/68)    CAPILLARY BLOOD GLUCOSE            PHYSICAL EXAM:  GENERAL: NAD, well-developed  HEAD:  Atraumatic, Normocephalic  EYES: EOMI, PERRLA, conjunctiva and sclera clear  NECK: Supple, No JVD  CHEST/LUNG: Clear to auscultation bilaterally; No wheeze  HEART: Regular rate and rhythm; No murmurs, rubs, or gallops  ABDOMEN: Soft, Nontender, Nondistended; Bowel sounds present  EXTREMITIES:  2+ Peripheral Pulses, No clubbing, cyanosis, or edema  PSYCH: AAOx3  NEUROLOGY: non-focal  SKIN: No rashes or lesions. Healing laceration noted to right arm.    LABS:      Assessment and Plan:  23 y/o female with PMHx of PCOS, ADHD, Bipolar 1 disorder, IBS presents to [facility] with an admitting diagnosis of Bipolar affective disorder, current episode mild or moderate depression. Pt has no medical complaints at this time. Denies chest pain, SOB, palpitations, headache, dizziness, abdominal pain, N/V/D, dysuria, constipation.    1. Bipolar Disorder- continue with medications as per psychiatric team.    2. Irritable Bowel Syndrome- continue Dicyclomine 20mg TID.

## 2020-02-15 LAB
ALBUMIN SERPL ELPH-MCNC: 4.2 G/DL — SIGNIFICANT CHANGE UP (ref 3.3–5)
ALP SERPL-CCNC: 90 U/L — SIGNIFICANT CHANGE UP (ref 40–120)
ALT FLD-CCNC: 24 U/L — SIGNIFICANT CHANGE UP (ref 4–33)
ANION GAP SERPL CALC-SCNC: 8 MMO/L — SIGNIFICANT CHANGE UP (ref 7–14)
AST SERPL-CCNC: 19 U/L — SIGNIFICANT CHANGE UP (ref 4–32)
BASOPHILS # BLD AUTO: 0.04 K/UL — SIGNIFICANT CHANGE UP (ref 0–0.2)
BASOPHILS NFR BLD AUTO: 0.9 % — SIGNIFICANT CHANGE UP (ref 0–2)
BILIRUB SERPL-MCNC: < 0.2 MG/DL — LOW (ref 0.2–1.2)
BUN SERPL-MCNC: 13 MG/DL — SIGNIFICANT CHANGE UP (ref 7–23)
CALCIUM SERPL-MCNC: 9.5 MG/DL — SIGNIFICANT CHANGE UP (ref 8.4–10.5)
CHLORIDE SERPL-SCNC: 110 MMOL/L — HIGH (ref 98–107)
CHOLEST SERPL-MCNC: 165 MG/DL — SIGNIFICANT CHANGE UP (ref 120–199)
CO2 SERPL-SCNC: 23 MMOL/L — SIGNIFICANT CHANGE UP (ref 22–31)
CREAT SERPL-MCNC: 1.01 MG/DL — SIGNIFICANT CHANGE UP (ref 0.5–1.3)
EOSINOPHIL # BLD AUTO: 0.06 K/UL — SIGNIFICANT CHANGE UP (ref 0–0.5)
EOSINOPHIL NFR BLD AUTO: 1.3 % — SIGNIFICANT CHANGE UP (ref 0–6)
GLUCOSE SERPL-MCNC: 89 MG/DL — SIGNIFICANT CHANGE UP (ref 70–99)
HCG SERPL-ACNC: < 5 MIU/ML — SIGNIFICANT CHANGE UP
HCT VFR BLD CALC: 40.9 % — SIGNIFICANT CHANGE UP (ref 34.5–45)
HDLC SERPL-MCNC: 52 MG/DL — SIGNIFICANT CHANGE UP (ref 45–65)
HGB BLD-MCNC: 13 G/DL — SIGNIFICANT CHANGE UP (ref 11.5–15.5)
IMM GRANULOCYTES NFR BLD AUTO: 0.2 % — SIGNIFICANT CHANGE UP (ref 0–1.5)
LIPID PNL WITH DIRECT LDL SERPL: 103 MG/DL — SIGNIFICANT CHANGE UP
LYMPHOCYTES # BLD AUTO: 2 K/UL — SIGNIFICANT CHANGE UP (ref 1–3.3)
LYMPHOCYTES # BLD AUTO: 43.3 % — SIGNIFICANT CHANGE UP (ref 13–44)
MCHC RBC-ENTMCNC: 29.2 PG — SIGNIFICANT CHANGE UP (ref 27–34)
MCHC RBC-ENTMCNC: 31.8 % — LOW (ref 32–36)
MCV RBC AUTO: 91.9 FL — SIGNIFICANT CHANGE UP (ref 80–100)
MONOCYTES # BLD AUTO: 0.31 K/UL — SIGNIFICANT CHANGE UP (ref 0–0.9)
MONOCYTES NFR BLD AUTO: 6.7 % — SIGNIFICANT CHANGE UP (ref 2–14)
NEUTROPHILS # BLD AUTO: 2.2 K/UL — SIGNIFICANT CHANGE UP (ref 1.8–7.4)
NEUTROPHILS NFR BLD AUTO: 47.6 % — SIGNIFICANT CHANGE UP (ref 43–77)
NRBC # FLD: 0 K/UL — SIGNIFICANT CHANGE UP (ref 0–0)
PLATELET # BLD AUTO: 235 K/UL — SIGNIFICANT CHANGE UP (ref 150–400)
PMV BLD: 10.5 FL — SIGNIFICANT CHANGE UP (ref 7–13)
POTASSIUM SERPL-MCNC: 4.6 MMOL/L — SIGNIFICANT CHANGE UP (ref 3.5–5.3)
POTASSIUM SERPL-SCNC: 4.6 MMOL/L — SIGNIFICANT CHANGE UP (ref 3.5–5.3)
PROT SERPL-MCNC: 6.2 G/DL — SIGNIFICANT CHANGE UP (ref 6–8.3)
RBC # BLD: 4.45 M/UL — SIGNIFICANT CHANGE UP (ref 3.8–5.2)
RBC # FLD: 12 % — SIGNIFICANT CHANGE UP (ref 10.3–14.5)
SODIUM SERPL-SCNC: 141 MMOL/L — SIGNIFICANT CHANGE UP (ref 135–145)
TRIGL SERPL-MCNC: 44 MG/DL — SIGNIFICANT CHANGE UP (ref 10–149)
TSH SERPL-MCNC: 0.73 UIU/ML — SIGNIFICANT CHANGE UP (ref 0.27–4.2)
WBC # BLD: 4.62 K/UL — SIGNIFICANT CHANGE UP (ref 3.8–10.5)
WBC # FLD AUTO: 4.62 K/UL — SIGNIFICANT CHANGE UP (ref 3.8–10.5)

## 2020-02-15 PROCEDURE — 99232 SBSQ HOSP IP/OBS MODERATE 35: CPT

## 2020-02-15 RX ADMIN — Medication 200 MILLIGRAM(S): at 09:04

## 2020-02-15 RX ADMIN — RISPERIDONE 4 MILLIGRAM(S): 4 TABLET ORAL at 09:04

## 2020-02-15 RX ADMIN — DULOXETINE HYDROCHLORIDE 20 MILLIGRAM(S): 30 CAPSULE, DELAYED RELEASE ORAL at 09:03

## 2020-02-15 RX ADMIN — Medication 1 MILLIGRAM(S): at 09:03

## 2020-02-15 RX ADMIN — RISPERIDONE 2 MILLIGRAM(S): 4 TABLET ORAL at 21:29

## 2020-02-15 RX ADMIN — Medication 1 MILLIGRAM(S): at 20:46

## 2020-02-16 PROCEDURE — 99232 SBSQ HOSP IP/OBS MODERATE 35: CPT

## 2020-02-16 RX ORDER — DULOXETINE HYDROCHLORIDE 30 MG/1
30 CAPSULE, DELAYED RELEASE ORAL DAILY
Refills: 0 | Status: DISCONTINUED | OUTPATIENT
Start: 2020-02-17 | End: 2020-02-20

## 2020-02-16 RX ADMIN — Medication 1 MILLIGRAM(S): at 09:41

## 2020-02-16 RX ADMIN — Medication 50 MILLIGRAM(S): at 21:16

## 2020-02-16 RX ADMIN — RISPERIDONE 2 MILLIGRAM(S): 4 TABLET ORAL at 20:50

## 2020-02-16 RX ADMIN — RISPERIDONE 4 MILLIGRAM(S): 4 TABLET ORAL at 09:41

## 2020-02-16 RX ADMIN — Medication 1 MILLIGRAM(S): at 20:50

## 2020-02-16 RX ADMIN — HALOPERIDOL DECANOATE 5 MILLIGRAM(S): 100 INJECTION INTRAMUSCULAR at 21:16

## 2020-02-16 RX ADMIN — DULOXETINE HYDROCHLORIDE 20 MILLIGRAM(S): 30 CAPSULE, DELAYED RELEASE ORAL at 09:41

## 2020-02-16 RX ADMIN — Medication 200 MILLIGRAM(S): at 09:41

## 2020-02-17 PROCEDURE — 99232 SBSQ HOSP IP/OBS MODERATE 35: CPT

## 2020-02-17 RX ADMIN — Medication 1 MILLIGRAM(S): at 20:18

## 2020-02-17 RX ADMIN — RISPERIDONE 4 MILLIGRAM(S): 4 TABLET ORAL at 10:27

## 2020-02-17 RX ADMIN — Medication 200 MILLIGRAM(S): at 10:27

## 2020-02-17 RX ADMIN — DULOXETINE HYDROCHLORIDE 30 MILLIGRAM(S): 30 CAPSULE, DELAYED RELEASE ORAL at 10:27

## 2020-02-17 RX ADMIN — RISPERIDONE 2 MILLIGRAM(S): 4 TABLET ORAL at 20:18

## 2020-02-17 RX ADMIN — Medication 1 MILLIGRAM(S): at 10:26

## 2020-02-18 PROCEDURE — 99232 SBSQ HOSP IP/OBS MODERATE 35: CPT | Mod: GC

## 2020-02-18 RX ADMIN — RISPERIDONE 2 MILLIGRAM(S): 4 TABLET ORAL at 21:09

## 2020-02-18 RX ADMIN — RISPERIDONE 4 MILLIGRAM(S): 4 TABLET ORAL at 09:38

## 2020-02-18 RX ADMIN — Medication 1 MILLIGRAM(S): at 21:09

## 2020-02-18 RX ADMIN — Medication 200 MILLIGRAM(S): at 09:38

## 2020-02-18 RX ADMIN — DULOXETINE HYDROCHLORIDE 30 MILLIGRAM(S): 30 CAPSULE, DELAYED RELEASE ORAL at 09:38

## 2020-02-18 RX ADMIN — Medication 1 MILLIGRAM(S): at 09:38

## 2020-02-19 PROCEDURE — 90832 PSYTX W PT 30 MINUTES: CPT

## 2020-02-19 RX ADMIN — RISPERIDONE 2 MILLIGRAM(S): 4 TABLET ORAL at 20:52

## 2020-02-19 RX ADMIN — Medication 20 MILLIGRAM(S): at 12:26

## 2020-02-19 RX ADMIN — RISPERIDONE 4 MILLIGRAM(S): 4 TABLET ORAL at 09:01

## 2020-02-19 RX ADMIN — Medication 1 MILLIGRAM(S): at 09:01

## 2020-02-19 RX ADMIN — DULOXETINE HYDROCHLORIDE 30 MILLIGRAM(S): 30 CAPSULE, DELAYED RELEASE ORAL at 09:01

## 2020-02-19 RX ADMIN — Medication 1 MILLIGRAM(S): at 20:52

## 2020-02-19 RX ADMIN — Medication 200 MILLIGRAM(S): at 09:01

## 2020-02-20 PROCEDURE — 99232 SBSQ HOSP IP/OBS MODERATE 35: CPT | Mod: GC

## 2020-02-20 RX ORDER — DULOXETINE HYDROCHLORIDE 30 MG/1
20 CAPSULE, DELAYED RELEASE ORAL ONCE
Refills: 0 | Status: COMPLETED | OUTPATIENT
Start: 2020-02-20 | End: 2020-02-20

## 2020-02-20 RX ORDER — ACETAMINOPHEN 500 MG
650 TABLET ORAL ONCE
Refills: 0 | Status: COMPLETED | OUTPATIENT
Start: 2020-02-20 | End: 2020-02-20

## 2020-02-20 RX ORDER — DULOXETINE HYDROCHLORIDE 30 MG/1
20 CAPSULE, DELAYED RELEASE ORAL DAILY
Refills: 0 | Status: DISCONTINUED | OUTPATIENT
Start: 2020-02-20 | End: 2020-02-24

## 2020-02-20 RX ORDER — CLONAZEPAM 1 MG
1 TABLET ORAL
Refills: 0 | Status: DISCONTINUED | OUTPATIENT
Start: 2020-02-20 | End: 2020-02-24

## 2020-02-20 RX ADMIN — Medication 1 MILLIGRAM(S): at 20:57

## 2020-02-20 RX ADMIN — Medication 650 MILLIGRAM(S): at 21:02

## 2020-02-20 RX ADMIN — Medication 650 MILLIGRAM(S): at 00:00

## 2020-02-20 RX ADMIN — RISPERIDONE 2 MILLIGRAM(S): 4 TABLET ORAL at 20:57

## 2020-02-20 RX ADMIN — Medication 1 MILLIGRAM(S): at 08:40

## 2020-02-20 RX ADMIN — Medication 200 MILLIGRAM(S): at 08:40

## 2020-02-20 RX ADMIN — RISPERIDONE 4 MILLIGRAM(S): 4 TABLET ORAL at 08:40

## 2020-02-20 RX ADMIN — DULOXETINE HYDROCHLORIDE 20 MILLIGRAM(S): 30 CAPSULE, DELAYED RELEASE ORAL at 10:33

## 2020-02-21 PROCEDURE — 99232 SBSQ HOSP IP/OBS MODERATE 35: CPT | Mod: GC

## 2020-02-21 RX ADMIN — RISPERIDONE 4 MILLIGRAM(S): 4 TABLET ORAL at 10:33

## 2020-02-21 RX ADMIN — RISPERIDONE 2 MILLIGRAM(S): 4 TABLET ORAL at 20:58

## 2020-02-21 RX ADMIN — Medication 1 MILLIGRAM(S): at 10:33

## 2020-02-21 RX ADMIN — Medication 1 MILLIGRAM(S): at 20:58

## 2020-02-21 RX ADMIN — Medication 200 MILLIGRAM(S): at 10:33

## 2020-02-21 RX ADMIN — DULOXETINE HYDROCHLORIDE 20 MILLIGRAM(S): 30 CAPSULE, DELAYED RELEASE ORAL at 10:33

## 2020-02-22 PROCEDURE — 99232 SBSQ HOSP IP/OBS MODERATE 35: CPT

## 2020-02-22 RX ADMIN — DULOXETINE HYDROCHLORIDE 20 MILLIGRAM(S): 30 CAPSULE, DELAYED RELEASE ORAL at 08:49

## 2020-02-22 RX ADMIN — Medication 1 MILLIGRAM(S): at 08:49

## 2020-02-22 RX ADMIN — RISPERIDONE 4 MILLIGRAM(S): 4 TABLET ORAL at 08:50

## 2020-02-22 RX ADMIN — RISPERIDONE 2 MILLIGRAM(S): 4 TABLET ORAL at 20:54

## 2020-02-22 RX ADMIN — Medication 1 MILLIGRAM(S): at 20:54

## 2020-02-22 RX ADMIN — Medication 200 MILLIGRAM(S): at 08:50

## 2020-02-23 PROCEDURE — 99232 SBSQ HOSP IP/OBS MODERATE 35: CPT

## 2020-02-23 RX ADMIN — RISPERIDONE 4 MILLIGRAM(S): 4 TABLET ORAL at 09:44

## 2020-02-23 RX ADMIN — RISPERIDONE 2 MILLIGRAM(S): 4 TABLET ORAL at 21:16

## 2020-02-23 RX ADMIN — DULOXETINE HYDROCHLORIDE 20 MILLIGRAM(S): 30 CAPSULE, DELAYED RELEASE ORAL at 11:19

## 2020-02-23 RX ADMIN — Medication 1 MILLIGRAM(S): at 21:16

## 2020-02-23 RX ADMIN — Medication 1 MILLIGRAM(S): at 09:15

## 2020-02-23 RX ADMIN — Medication 200 MILLIGRAM(S): at 09:44

## 2020-02-24 VITALS — DIASTOLIC BLOOD PRESSURE: 68 MMHG | TEMPERATURE: 98 F | SYSTOLIC BLOOD PRESSURE: 98 MMHG | HEART RATE: 66 BPM

## 2020-02-24 PROCEDURE — 99239 HOSP IP/OBS DSCHRG MGMT >30: CPT | Mod: GC

## 2020-02-24 RX ORDER — CLONAZEPAM 1 MG
1 TABLET ORAL
Qty: 28 | Refills: 0
Start: 2020-02-24 | End: 2020-03-08

## 2020-02-24 RX ORDER — CLONAZEPAM 1 MG
1 TABLET ORAL
Qty: 14 | Refills: 0
Start: 2020-02-24 | End: 2020-03-08

## 2020-02-24 RX ORDER — RISPERIDONE 4 MG/1
1 TABLET ORAL
Qty: 14 | Refills: 0
Start: 2020-02-24 | End: 2020-03-08

## 2020-02-24 RX ORDER — TOPIRAMATE 25 MG
1 TABLET ORAL
Qty: 14 | Refills: 0
Start: 2020-02-24 | End: 2020-03-08

## 2020-02-24 RX ORDER — PROPRANOLOL HCL 160 MG
1 CAPSULE, EXTENDED RELEASE 24HR ORAL
Qty: 28 | Refills: 0
Start: 2020-02-24 | End: 2020-03-08

## 2020-02-24 RX ORDER — DULOXETINE HYDROCHLORIDE 30 MG/1
1 CAPSULE, DELAYED RELEASE ORAL
Qty: 0 | Refills: 0 | DISCHARGE
Start: 2020-02-24

## 2020-02-24 RX ORDER — RISPERIDONE 4 MG/1
1 TABLET ORAL
Qty: 0 | Refills: 0 | DISCHARGE
Start: 2020-02-24

## 2020-02-24 RX ORDER — TOPIRAMATE 25 MG
1 TABLET ORAL
Qty: 0 | Refills: 0 | DISCHARGE
Start: 2020-02-24

## 2020-02-24 RX ADMIN — DULOXETINE HYDROCHLORIDE 20 MILLIGRAM(S): 30 CAPSULE, DELAYED RELEASE ORAL at 08:43

## 2020-02-24 RX ADMIN — Medication 1 MILLIGRAM(S): at 08:43

## 2020-02-24 RX ADMIN — Medication 200 MILLIGRAM(S): at 08:42

## 2020-02-24 RX ADMIN — RISPERIDONE 4 MILLIGRAM(S): 4 TABLET ORAL at 08:42

## 2020-02-25 ENCOUNTER — OUTPATIENT (OUTPATIENT)
Dept: OUTPATIENT SERVICES | Facility: HOSPITAL | Age: 25
LOS: 1 days | Discharge: STILL ADMITTED OR ADMINISTRATIVE | End: 2020-02-25

## 2020-02-27 DIAGNOSIS — F60.3 BORDERLINE PERSONALITY DISORDER: ICD-10-CM

## 2020-11-10 ENCOUNTER — INPATIENT (INPATIENT)
Facility: HOSPITAL | Age: 25
LOS: 16 days | Discharge: HOME | End: 2020-11-27
Attending: PSYCHIATRY & NEUROLOGY | Admitting: PSYCHIATRY & NEUROLOGY
Payer: MEDICAID

## 2020-11-10 VITALS
OXYGEN SATURATION: 100 % | HEART RATE: 102 BPM | DIASTOLIC BLOOD PRESSURE: 63 MMHG | HEIGHT: 67 IN | SYSTOLIC BLOOD PRESSURE: 108 MMHG | TEMPERATURE: 99 F | RESPIRATION RATE: 18 BRPM

## 2020-11-10 DIAGNOSIS — F31.9 BIPOLAR DISORDER, UNSPECIFIED: ICD-10-CM

## 2020-11-10 LAB
ALBUMIN SERPL ELPH-MCNC: 4.7 G/DL — SIGNIFICANT CHANGE UP (ref 3.3–5)
ALP SERPL-CCNC: 81 U/L — SIGNIFICANT CHANGE UP (ref 40–120)
ALT FLD-CCNC: 24 U/L — SIGNIFICANT CHANGE UP (ref 4–33)
AMPHET UR-MCNC: NEGATIVE — SIGNIFICANT CHANGE UP
ANION GAP SERPL CALC-SCNC: 12 MMO/L — SIGNIFICANT CHANGE UP (ref 7–14)
APAP SERPL-MCNC: < 15 UG/ML — LOW (ref 15–25)
APPEARANCE UR: CLEAR — SIGNIFICANT CHANGE UP
AST SERPL-CCNC: 20 U/L — SIGNIFICANT CHANGE UP (ref 4–32)
B PERT DNA SPEC QL NAA+PROBE: SIGNIFICANT CHANGE UP
BARBITURATES UR SCN-MCNC: NEGATIVE — SIGNIFICANT CHANGE UP
BASOPHILS # BLD AUTO: 0.02 K/UL — SIGNIFICANT CHANGE UP (ref 0–0.2)
BASOPHILS NFR BLD AUTO: 0.3 % — SIGNIFICANT CHANGE UP (ref 0–2)
BENZODIAZ UR-MCNC: NEGATIVE — SIGNIFICANT CHANGE UP
BILIRUB SERPL-MCNC: 0.3 MG/DL — SIGNIFICANT CHANGE UP (ref 0.2–1.2)
BILIRUB UR-MCNC: NEGATIVE — SIGNIFICANT CHANGE UP
BLOOD UR QL VISUAL: NEGATIVE — SIGNIFICANT CHANGE UP
BUN SERPL-MCNC: 20 MG/DL — SIGNIFICANT CHANGE UP (ref 7–23)
C PNEUM DNA SPEC QL NAA+PROBE: SIGNIFICANT CHANGE UP
CALCIUM SERPL-MCNC: 9.9 MG/DL — SIGNIFICANT CHANGE UP (ref 8.4–10.5)
CANNABINOIDS UR-MCNC: NEGATIVE — SIGNIFICANT CHANGE UP
CHLORIDE SERPL-SCNC: 109 MMOL/L — HIGH (ref 98–107)
CO2 SERPL-SCNC: 21 MMOL/L — LOW (ref 22–31)
COCAINE METAB.OTHER UR-MCNC: NEGATIVE — SIGNIFICANT CHANGE UP
COLOR SPEC: COLORLESS — SIGNIFICANT CHANGE UP
CREAT SERPL-MCNC: 0.95 MG/DL — SIGNIFICANT CHANGE UP (ref 0.5–1.3)
EOSINOPHIL # BLD AUTO: 0.08 K/UL — SIGNIFICANT CHANGE UP (ref 0–0.5)
EOSINOPHIL NFR BLD AUTO: 1.4 % — SIGNIFICANT CHANGE UP (ref 0–6)
ETHANOL BLD-MCNC: < 10 MG/DL — SIGNIFICANT CHANGE UP
FLUAV H1 2009 PAND RNA SPEC QL NAA+PROBE: SIGNIFICANT CHANGE UP
FLUAV H1 RNA SPEC QL NAA+PROBE: SIGNIFICANT CHANGE UP
FLUAV H3 RNA SPEC QL NAA+PROBE: SIGNIFICANT CHANGE UP
FLUAV SUBTYP SPEC NAA+PROBE: SIGNIFICANT CHANGE UP
FLUBV RNA SPEC QL NAA+PROBE: SIGNIFICANT CHANGE UP
GLUCOSE SERPL-MCNC: 93 MG/DL — SIGNIFICANT CHANGE UP (ref 70–99)
GLUCOSE UR-MCNC: NEGATIVE — SIGNIFICANT CHANGE UP
HADV DNA SPEC QL NAA+PROBE: SIGNIFICANT CHANGE UP
HCG SERPL-ACNC: < 5 MIU/ML — SIGNIFICANT CHANGE UP
HCOV PNL SPEC NAA+PROBE: SIGNIFICANT CHANGE UP
HCT VFR BLD CALC: 43.3 % — SIGNIFICANT CHANGE UP (ref 34.5–45)
HGB BLD-MCNC: 14.4 G/DL — SIGNIFICANT CHANGE UP (ref 11.5–15.5)
HMPV RNA SPEC QL NAA+PROBE: SIGNIFICANT CHANGE UP
HPIV1 RNA SPEC QL NAA+PROBE: SIGNIFICANT CHANGE UP
HPIV2 RNA SPEC QL NAA+PROBE: SIGNIFICANT CHANGE UP
HPIV3 RNA SPEC QL NAA+PROBE: SIGNIFICANT CHANGE UP
HPIV4 RNA SPEC QL NAA+PROBE: SIGNIFICANT CHANGE UP
IMM GRANULOCYTES NFR BLD AUTO: 0.3 % — SIGNIFICANT CHANGE UP (ref 0–1.5)
KETONES UR-MCNC: NEGATIVE — SIGNIFICANT CHANGE UP
LEUKOCYTE ESTERASE UR-ACNC: NEGATIVE — SIGNIFICANT CHANGE UP
LYMPHOCYTES # BLD AUTO: 2.69 K/UL — SIGNIFICANT CHANGE UP (ref 1–3.3)
LYMPHOCYTES # BLD AUTO: 46.2 % — HIGH (ref 13–44)
MCHC RBC-ENTMCNC: 30 PG — SIGNIFICANT CHANGE UP (ref 27–34)
MCHC RBC-ENTMCNC: 33.3 % — SIGNIFICANT CHANGE UP (ref 32–36)
MCV RBC AUTO: 90.2 FL — SIGNIFICANT CHANGE UP (ref 80–100)
METHADONE UR-MCNC: NEGATIVE — SIGNIFICANT CHANGE UP
MONOCYTES # BLD AUTO: 0.33 K/UL — SIGNIFICANT CHANGE UP (ref 0–0.9)
MONOCYTES NFR BLD AUTO: 5.7 % — SIGNIFICANT CHANGE UP (ref 2–14)
NEUTROPHILS # BLD AUTO: 2.68 K/UL — SIGNIFICANT CHANGE UP (ref 1.8–7.4)
NEUTROPHILS NFR BLD AUTO: 46.1 % — SIGNIFICANT CHANGE UP (ref 43–77)
NITRITE UR-MCNC: NEGATIVE — SIGNIFICANT CHANGE UP
NRBC # FLD: 0 K/UL — SIGNIFICANT CHANGE UP (ref 0–0)
OPIATES UR-MCNC: NEGATIVE — SIGNIFICANT CHANGE UP
OXYCODONE UR-MCNC: NEGATIVE — SIGNIFICANT CHANGE UP
PCP UR-MCNC: NEGATIVE — SIGNIFICANT CHANGE UP
PH UR: 6.5 — SIGNIFICANT CHANGE UP (ref 5–8)
PLATELET # BLD AUTO: 193 K/UL — SIGNIFICANT CHANGE UP (ref 150–400)
PMV BLD: 10.1 FL — SIGNIFICANT CHANGE UP (ref 7–13)
POTASSIUM SERPL-MCNC: 4.9 MMOL/L — SIGNIFICANT CHANGE UP (ref 3.5–5.3)
POTASSIUM SERPL-SCNC: 4.9 MMOL/L — SIGNIFICANT CHANGE UP (ref 3.5–5.3)
PROT SERPL-MCNC: 6.6 G/DL — SIGNIFICANT CHANGE UP (ref 6–8.3)
PROT UR-MCNC: NEGATIVE — SIGNIFICANT CHANGE UP
RAPID RVP RESULT: SIGNIFICANT CHANGE UP
RBC # BLD: 4.8 M/UL — SIGNIFICANT CHANGE UP (ref 3.8–5.2)
RBC # FLD: 11.9 % — SIGNIFICANT CHANGE UP (ref 10.3–14.5)
RSV RNA SPEC QL NAA+PROBE: SIGNIFICANT CHANGE UP
RV+EV RNA SPEC QL NAA+PROBE: SIGNIFICANT CHANGE UP
SALICYLATES SERPL-MCNC: < 5 MG/DL — LOW (ref 15–30)
SARS-COV-2 RNA SPEC QL NAA+PROBE: SIGNIFICANT CHANGE UP
SODIUM SERPL-SCNC: 142 MMOL/L — SIGNIFICANT CHANGE UP (ref 135–145)
SP GR SPEC: 1.01 — SIGNIFICANT CHANGE UP (ref 1–1.04)
TSH SERPL-MCNC: 1.73 UIU/ML — SIGNIFICANT CHANGE UP (ref 0.27–4.2)
UROBILINOGEN FLD QL: NORMAL — SIGNIFICANT CHANGE UP
WBC # BLD: 5.82 K/UL — SIGNIFICANT CHANGE UP (ref 3.8–10.5)
WBC # FLD AUTO: 5.82 K/UL — SIGNIFICANT CHANGE UP (ref 3.8–10.5)

## 2020-11-10 PROCEDURE — 99285 EMERGENCY DEPT VISIT HI MDM: CPT | Mod: GC

## 2020-11-10 RX ORDER — TOPIRAMATE 25 MG
200 TABLET ORAL DAILY
Refills: 0 | Status: DISCONTINUED | OUTPATIENT
Start: 2020-11-10 | End: 2020-11-27

## 2020-11-10 RX ORDER — CLONAZEPAM 1 MG
1 TABLET ORAL
Refills: 0 | Status: DISCONTINUED | OUTPATIENT
Start: 2020-11-10 | End: 2020-11-17

## 2020-11-10 RX ORDER — RISPERIDONE 4 MG/1
2 TABLET ORAL THREE TIMES A DAY
Refills: 0 | Status: DISCONTINUED | OUTPATIENT
Start: 2020-11-10 | End: 2020-11-16

## 2020-11-10 RX ORDER — HALOPERIDOL DECANOATE 100 MG/ML
5 INJECTION INTRAMUSCULAR EVERY 6 HOURS
Refills: 0 | Status: DISCONTINUED | OUTPATIENT
Start: 2020-11-10 | End: 2020-11-27

## 2020-11-10 RX ORDER — ACETAMINOPHEN 500 MG
650 TABLET ORAL EVERY 6 HOURS
Refills: 0 | Status: DISCONTINUED | OUTPATIENT
Start: 2020-11-10 | End: 2020-11-27

## 2020-11-10 RX ADMIN — RISPERIDONE 2 MILLIGRAM(S): 4 TABLET ORAL at 22:03

## 2020-11-10 RX ADMIN — Medication 1 MILLIGRAM(S): at 22:03

## 2020-11-10 NOTE — ED BEHAVIORAL HEALTH ASSESSMENT NOTE - DESCRIPTION (FIRST USE, LAST USE, QUANTITY, FREQUENCY, DURATION)
Chart history of: patient reporting remote hx of abusing alcohol and Alprazolam when she was 19 years-old. In the chart, patient also denied history of withdrawal symptoms including withdrawal seizures. Patient currently denies current alcohol or drug use. See Above

## 2020-11-10 NOTE — ED BEHAVIORAL HEALTH ASSESSMENT NOTE - NS ED BHA HOMICIDALITY PRESENT CURRENT IDEATION
Detail Level: Simple Additional Notes: Dr. Butts checked wound. Some wound edge necrosis noted around the suture line. A light bandage was applied. None known

## 2020-11-10 NOTE — ED BEHAVIORAL HEALTH ASSESSMENT NOTE - PAST PSYCHOTROPIC MEDICATION
Lithium (discrepancies in blood levels even with consistent dosing, per the mother), Depakote, Xanax, Ketamine infusions, Cymbalta, Lamictal (rash on mouth), Zyprexa (significant weight gain), Seroquel "tics," ECT (30+ sessions in one year, stopped by Dr. Medina at [facility] after ineffective alleviation of patient's symptoms).

## 2020-11-10 NOTE — ED BEHAVIORAL HEALTH ASSESSMENT NOTE - ACTIVATING EVENTS/STRESSORS
Non-compliant or not receiving treatment/Triggering events leading to humiliation, shame, and/or despair (e.g. Loss of relationship, financial or health status) (real or anticipated)/Hopeless about or dissatisfied with provider or treatment Change in provider or treatment (i.e., medications, psychotherapy, milieu)/Hopeless about or dissatisfied with provider or treatment/Triggering events leading to humiliation, shame, and/or despair (e.g. Loss of relationship, financial or health status) (real or anticipated)

## 2020-11-10 NOTE — ED ADULT NURSE NOTE - CHIEF COMPLAINT QUOTE
Pt states she is feeling depressed and stressed +SI with a plan to overdose, denies HI, Denies AH, TH, VH, Denies ETOH or drug use. PMH: Bipolar, borderline, depression and anxiety

## 2020-11-10 NOTE — ED ADULT NURSE NOTE - NSIMPLEMENTINTERV_GEN_ALL_ED
Implemented All Universal Safety Interventions:  [place] to call system. Call bell, personal items and telephone within reach. Instruct patient to call for assistance. Room bathroom lighting operational. Non-slip footwear when patient is off stretcher. Physically safe environment: no spills, clutter or unnecessary equipment. Stretcher in lowest position, wheels locked, appropriate side rails in place.

## 2020-11-10 NOTE — ED BEHAVIORAL HEALTH NOTE - BEHAVIORAL HEALTH NOTE
COVID Exposure Screen- Patient    1.	*In the past 14 days, have you been around anyone with a positive COVID-19 test?*   (  ) Yes   (  x) No   (  ) Unknown- Reason (e.g. patient uncertain, sedated, refusing to answer, etc.):  ______  IF YES PROCEED TO QUESTION #2. IF NO or UNKNOWN, PLEASE SKIP TO QUESTION #7  2.	Were you within 6 feet of them for at least 15 minutes? (  ) Yes   (  ) No   (  ) Unknown- Reason: ______    3.	Have you provided care for them? (  ) Yes   (  ) No   (  ) Unknown- Reason: ______    4.	Have you had direct physical contact with them (touched, hugged, or kissed them)? (  ) Yes   (  ) No    (  ) Unknown- Reason: ______    5.	Have you shared eating or drinking utensils with them? (  ) Yes   (  ) No    (  ) Unknown- Reason: ______    6.	Have they sneezed, coughed, or somehow got respiratory droplets on you? (  ) Yes   (  ) No    (  ) Unknown- Reason: ______    7.	*Have you been out of [place] within the past 14 days?*  (  ) Yes   ( x ) No   (  ) Unknown- Reason (e.g. patient uncertain, sedated, refusing to answer, etc.): _______  IF YES PLEASE ANSWER THE FOLLOWING QUESTIONS:  8.	Which state/country have you been to? ______   9.	Were you there over 24 hours? (  ) Yes   (  ) No    (  ) Unknown- Reason: ______    10.	What date did you return to [facility]? ______

## 2020-11-10 NOTE — ED BEHAVIORAL HEALTH ASSESSMENT NOTE - DESCRIPTION
Calm, cooperative, no agitation, in good behavioral control. No PRNs needed.     Vital Signs Last 24 Hrs  T(C): 37.1 (10 Nov 2020 16:22), Max: 37.1 (10 Nov 2020 16:22)  T(F): 98.7 (10 Nov 2020 16:22), Max: 98.7 (10 Nov 2020 16:22)  HR: 102 (10 Nov 2020 16:22) (102 - 102)  BP: 108/63 (10 Nov 2020 16:22) (108/63 - 108/63)  BP(mean): --  RR: 18 (10 Nov 2020 16:22) (18 - 18)  SpO2: 100% (10 Nov 2020 16:22) (100% - 100%) PCOS per chart, patient currently denies PMSH Patient is living with her parents (currently with her mother as her parents recently  and her father is looking for a new permanent residence). She is unemployed and parents financially support her. She did some college and is currently enrolled at [employer].

## 2020-11-10 NOTE — ED BEHAVIORAL HEALTH ASSESSMENT NOTE - ADDITIONAL DETAILS ALL
See HPI; patient also reports NSSIB of cutting (last episode of cutting was in February 2020 per the patient)

## 2020-11-10 NOTE — ED PROVIDER NOTE - PMH
ADHD (attention deficit hyperactivity disorder)    Bipolar 1 disorder    Borderline personality disorder    PCOS (polycystic ovarian syndrome)

## 2020-11-10 NOTE — ED PROVIDER NOTE - OBJECTIVE STATEMENT
This is a  25 F, pmh bipolar disorder and border line personality disorder with c/o SI. Pt states she is not doing well, she is unable to function, she thinks she is a failure. She fails in everything, unable to keep a job, unable to finish a semester in school, does not able to have meaningful relationship. She feels her family does not lover her. She states she goes to bed with her open medication bottles and a glass of water  to bed, and it's only a matter of time, she will overdose on her pills. "I do not want to live anymore. " This is a  25 F, pmh bipolar disorder and border line personality disorder with c/o SI. Pt states she is not doing well, she is unable to function, she thinks she is a failure. She fails in everything, unable to keep a job, unable to finish a semester in school, does not able to have meaningful relationship. She feels her family does not lover her. She states she goes to bed with her open medication bottles and a glass of water  to bed, and it's only a matter of time, she will overdose on her pills. "I do not want to live anymore. "  Pt endorsees [facility]

## 2020-11-10 NOTE — ED BEHAVIORAL HEALTH ASSESSMENT NOTE - OTHER PAST PSYCHIATRIC HISTORY (INCLUDE DETAILS REGARDING ONSET, COURSE OF ILLNESS, INPATIENT/OUTPATIENT TREATMENT)
Charted history of patient previously reporting history of bipolar disorder (rapid cycling), ADHD and depression with SI since 5yo, multiple psych hospitalizations (last one was February 2/14/20 to 2/24/20) for suicidality. Patient reports two suicide attempts (OD on Lithium when she was 18 years old, requiring hospital treatment and by suffocation via Klonipine OD at age 17, not requiring medical treatment) and a history of NSSIB (cutting, most recently February 2020). Charted history of patient previously reporting history of bipolar disorder (rapid cycling), ADHD and depression with SI since 7yo, multiple psych hospitalizations (last one was February 2/14/20 to 2/24/20) for suicidality. Patient reports two suicide attempts (OD on Lithium when she was 18 years old, requiring hospital treatment and by suffocation via Klonipin OD at age 17, not requiring medical treatment) and a history of NSSIB (cutting, most recently February 2020).

## 2020-11-10 NOTE — ED PROVIDER NOTE - CLINICAL SUMMARY MEDICAL DECISION MAKING FREE TEXT BOX
This is a  25 F, pmh bipolar disorder and border line personality disorder with c/o SI. Pt states she is not doing well, she is unable to function, she thinks she is a failure. She fails in everything, unable to keep a job, unable to finish a semester in school, does not able to have meaningful relationship. She feels her family does not lover her. She states she goes to bed with her open medication bottles and a glass of water  to bed, and it's only a matter of time, she will overdose on her pills. "I do not want to live anymore. "  Labs, psych consult- This is a  25 F, pmh bipolar disorder and border line personality disorder with c/o SI. Pt states she is not doing well, she is unable to function, she thinks she is a failure. She fails in everything, unable to keep a job, unable to finish a semester in school, does not able to have meaningful relationship. She feels her family does not lover her. She states she goes to bed with her open medication bottles and a glass of water  to bed, and it's only a matter of time, she will overdose on her pills. "I do not want to live anymore. "  Labs- wnl, psych consult- inpatient tx

## 2020-11-10 NOTE — ED BEHAVIORAL HEALTH ASSESSMENT NOTE - RISK ASSESSMENT
The patient is at high acute risk of suicide. The chronic risk factors of harm for the patient include: PPHx of Bipolar D/O, Borderline Personality D/O, extensive history of inpatient hospitalizations, history of multiple failed medication trials, currently unemployed, history of two SAs, history of NSSIB, history of substance misuse (per chart), history of aggression, history of impulsivity, extensive family history of Psychiatric illnesses, substance abuse, and one suicide, patient's age, and history of trauma. Acute risk factors of harm for the patient include: current mood d/o episode, current active suicidal ideations with intent and plan, access to lethal means (medications at home), currently in transition regarding outpatient Psychiatric care, and current feelings of hopelessness. Protective factors for the patient include: stable housing, supportive family relationships, lack of symptoms of psychosis, lack of current substance use, and is currently help-seeking. Given the patient's risk factors and protective factors, the patient is at high chronic AND acute risk of suicide and requires inpatient hospitalization for safety and stabilization. High Acute Suicide Risk

## 2020-11-10 NOTE — ED BEHAVIORAL HEALTH ASSESSMENT NOTE - CURRENT MEDICATION
Risperidone, Lithium, Propranolol, and Klonopin Risperidone 2mg tid; klonopin 1mg bid; topamax 200mg qam
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
## 2020-11-10 NOTE — ED BEHAVIORAL HEALTH ASSESSMENT NOTE - PSYCHIATRIC ISSUES AND PLAN (INCLUDE STANDING AND PRN MEDICATION)
Recommend starting with home medications of Risperidone 2 mg TID, Lithium 600 mg  mg QAM, Propranolol 20 mg TID, Klonopin 0.25 mg QAM and 1 mg QHS, defer to Primary Treatment Team for Medication/Dose changes, PRNS Haloperidol 5mg PO/IM, Lorazepam 2mg PO/IM, Benadryl 50mg PO. Recommend starting with home medications of Risperidone 2 mg TID, Klonopin 1mg bid; Topamax 200mg qam  defer to Primary Treatment Team for Medication/Dose changes, PRNS Haloperidol 5mg PO/IM, Lorazepam 2mg PO/IM,
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
## 2020-11-10 NOTE — ED ADULT NURSE NOTE - OBJECTIVE STATEMENT
Pt is A & O x3, brought in by mother for worsening suicidality and depression. Patient appears anxious.  Pt reports feeling "like a failure, and life isn't worth living." Endorses passive SI as she knows she does not have means to complete and she is here to get help. Plan at home was to OD on her prescription meds. Denies HI, AH/VH, ETOH, and substance use. Previous suicide attempt at 16 yo via OD.

## 2020-11-10 NOTE — ED BEHAVIORAL HEALTH ASSESSMENT NOTE - SUICIDE RISK FACTORS
Access to lethal methods (pills, firearm, etc.: Ask specifically about presence or absence of a firearm in the home or ease of accessing/Impulsivity/Mood Disorder current/past/Cluster B Personality disorders or traits current/past/Current mood episode/Family history of suicide/Hopelessness or despair/History of abuse/trauma/Family History of Suicidal behavior
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
## 2020-11-10 NOTE — ED BEHAVIORAL HEALTH ASSESSMENT NOTE - OTHER
Patient reports that her treatment by most recent outpatient Psychiatrist Dr. Medrano have not been beneficial for her. not assessed

## 2020-11-11 PROCEDURE — 99222 1ST HOSP IP/OBS MODERATE 55: CPT | Mod: 25

## 2020-11-11 PROCEDURE — 90853 GROUP PSYCHOTHERAPY: CPT

## 2020-11-11 RX ORDER — CARIPRAZINE 1.5 MG/1
1 CAPSULE, GELATIN COATED ORAL
Qty: 30 | Refills: 0
Start: 2020-11-11 | End: 2020-12-10

## 2020-11-11 RX ORDER — GABAPENTIN 400 MG/1
100 CAPSULE ORAL THREE TIMES A DAY
Refills: 0 | Status: DISCONTINUED | OUTPATIENT
Start: 2020-11-11 | End: 2020-11-12

## 2020-11-11 RX ADMIN — RISPERIDONE 2 MILLIGRAM(S): 4 TABLET ORAL at 21:06

## 2020-11-11 RX ADMIN — Medication 1 MILLIGRAM(S): at 21:06

## 2020-11-11 RX ADMIN — Medication 200 MILLIGRAM(S): at 09:06

## 2020-11-11 RX ADMIN — RISPERIDONE 2 MILLIGRAM(S): 4 TABLET ORAL at 09:06

## 2020-11-11 RX ADMIN — Medication 1 MILLIGRAM(S): at 09:06

## 2020-11-11 RX ADMIN — RISPERIDONE 2 MILLIGRAM(S): 4 TABLET ORAL at 14:22

## 2020-11-11 RX ADMIN — GABAPENTIN 100 MILLIGRAM(S): 400 CAPSULE ORAL at 21:06

## 2020-11-12 LAB
SARS-COV-2 IGG SERPL QL IA: NEGATIVE — SIGNIFICANT CHANGE UP
SARS-COV-2 IGM SERPL IA-ACNC: <0.1 INDEX — SIGNIFICANT CHANGE UP

## 2020-11-12 PROCEDURE — 99232 SBSQ HOSP IP/OBS MODERATE 35: CPT

## 2020-11-12 RX ORDER — GABAPENTIN 400 MG/1
100 CAPSULE ORAL
Refills: 0 | Status: DISCONTINUED | OUTPATIENT
Start: 2020-11-12 | End: 2020-11-14

## 2020-11-12 RX ADMIN — RISPERIDONE 2 MILLIGRAM(S): 4 TABLET ORAL at 21:35

## 2020-11-12 RX ADMIN — GABAPENTIN 100 MILLIGRAM(S): 400 CAPSULE ORAL at 21:35

## 2020-11-12 RX ADMIN — RISPERIDONE 2 MILLIGRAM(S): 4 TABLET ORAL at 11:07

## 2020-11-12 RX ADMIN — Medication 1 MILLIGRAM(S): at 11:07

## 2020-11-12 RX ADMIN — GABAPENTIN 100 MILLIGRAM(S): 400 CAPSULE ORAL at 14:02

## 2020-11-12 RX ADMIN — Medication 200 MILLIGRAM(S): at 11:07

## 2020-11-12 RX ADMIN — GABAPENTIN 100 MILLIGRAM(S): 400 CAPSULE ORAL at 11:07

## 2020-11-12 RX ADMIN — Medication 1 MILLIGRAM(S): at 21:35

## 2020-11-12 RX ADMIN — RISPERIDONE 2 MILLIGRAM(S): 4 TABLET ORAL at 14:02

## 2020-11-13 PROCEDURE — 90837 PSYTX W PT 60 MINUTES: CPT

## 2020-11-13 PROCEDURE — 99232 SBSQ HOSP IP/OBS MODERATE 35: CPT

## 2020-11-13 RX ADMIN — Medication 1 MILLIGRAM(S): at 21:03

## 2020-11-13 RX ADMIN — Medication 200 MILLIGRAM(S): at 09:00

## 2020-11-13 RX ADMIN — RISPERIDONE 2 MILLIGRAM(S): 4 TABLET ORAL at 09:00

## 2020-11-13 RX ADMIN — GABAPENTIN 100 MILLIGRAM(S): 400 CAPSULE ORAL at 09:00

## 2020-11-13 RX ADMIN — RISPERIDONE 2 MILLIGRAM(S): 4 TABLET ORAL at 13:23

## 2020-11-13 RX ADMIN — Medication 1 MILLIGRAM(S): at 09:00

## 2020-11-13 RX ADMIN — GABAPENTIN 100 MILLIGRAM(S): 400 CAPSULE ORAL at 21:03

## 2020-11-13 RX ADMIN — RISPERIDONE 2 MILLIGRAM(S): 4 TABLET ORAL at 21:03

## 2020-11-14 PROCEDURE — 99232 SBSQ HOSP IP/OBS MODERATE 35: CPT

## 2020-11-14 RX ORDER — ALBUTEROL 90 UG/1
2 AEROSOL, METERED ORAL EVERY 6 HOURS
Refills: 0 | Status: DISCONTINUED | OUTPATIENT
Start: 2020-11-14 | End: 2020-11-16

## 2020-11-14 RX ORDER — GABAPENTIN 400 MG/1
100 CAPSULE ORAL THREE TIMES A DAY
Refills: 0 | Status: DISCONTINUED | OUTPATIENT
Start: 2020-11-14 | End: 2020-11-17

## 2020-11-14 RX ADMIN — Medication 1 MILLIGRAM(S): at 08:30

## 2020-11-14 RX ADMIN — RISPERIDONE 2 MILLIGRAM(S): 4 TABLET ORAL at 21:12

## 2020-11-14 RX ADMIN — Medication 200 MILLIGRAM(S): at 08:30

## 2020-11-14 RX ADMIN — Medication 1 MILLIGRAM(S): at 21:12

## 2020-11-14 RX ADMIN — GABAPENTIN 100 MILLIGRAM(S): 400 CAPSULE ORAL at 21:12

## 2020-11-14 RX ADMIN — ALBUTEROL 2 PUFF(S): 90 AEROSOL, METERED ORAL at 17:00

## 2020-11-14 RX ADMIN — RISPERIDONE 2 MILLIGRAM(S): 4 TABLET ORAL at 12:08

## 2020-11-14 RX ADMIN — GABAPENTIN 100 MILLIGRAM(S): 400 CAPSULE ORAL at 08:30

## 2020-11-14 RX ADMIN — RISPERIDONE 2 MILLIGRAM(S): 4 TABLET ORAL at 08:30

## 2020-11-14 RX ADMIN — GABAPENTIN 100 MILLIGRAM(S): 400 CAPSULE ORAL at 12:08

## 2020-11-15 PROCEDURE — 99232 SBSQ HOSP IP/OBS MODERATE 35: CPT

## 2020-11-15 RX ADMIN — GABAPENTIN 100 MILLIGRAM(S): 400 CAPSULE ORAL at 22:06

## 2020-11-15 RX ADMIN — RISPERIDONE 2 MILLIGRAM(S): 4 TABLET ORAL at 22:06

## 2020-11-15 RX ADMIN — Medication 200 MILLIGRAM(S): at 09:11

## 2020-11-15 RX ADMIN — Medication 1 MILLIGRAM(S): at 22:06

## 2020-11-15 RX ADMIN — GABAPENTIN 100 MILLIGRAM(S): 400 CAPSULE ORAL at 09:11

## 2020-11-15 RX ADMIN — RISPERIDONE 2 MILLIGRAM(S): 4 TABLET ORAL at 13:15

## 2020-11-15 RX ADMIN — GABAPENTIN 100 MILLIGRAM(S): 400 CAPSULE ORAL at 13:15

## 2020-11-15 RX ADMIN — HALOPERIDOL DECANOATE 5 MILLIGRAM(S): 100 INJECTION INTRAMUSCULAR at 18:35

## 2020-11-15 RX ADMIN — RISPERIDONE 2 MILLIGRAM(S): 4 TABLET ORAL at 09:11

## 2020-11-15 RX ADMIN — Medication 1 MILLIGRAM(S): at 09:11

## 2020-11-15 RX ADMIN — Medication 2 MILLIGRAM(S): at 18:35

## 2020-11-16 PROCEDURE — 99232 SBSQ HOSP IP/OBS MODERATE 35: CPT

## 2020-11-16 PROCEDURE — 90834 PSYTX W PT 45 MINUTES: CPT

## 2020-11-16 RX ORDER — CARIPRAZINE 1.5 MG/1
1.5 CAPSULE, GELATIN COATED ORAL DAILY
Refills: 0 | Status: DISCONTINUED | OUTPATIENT
Start: 2020-11-16 | End: 2020-11-20

## 2020-11-16 RX ORDER — GABAPENTIN 400 MG/1
200 CAPSULE ORAL ONCE
Refills: 0 | Status: COMPLETED | OUTPATIENT
Start: 2020-11-16 | End: 2020-11-16

## 2020-11-16 RX ORDER — ALBUTEROL 90 UG/1
2 AEROSOL, METERED ORAL EVERY 6 HOURS
Refills: 0 | Status: DISCONTINUED | OUTPATIENT
Start: 2020-11-16 | End: 2020-11-27

## 2020-11-16 RX ORDER — RISPERIDONE 4 MG/1
2 TABLET ORAL
Refills: 0 | Status: DISCONTINUED | OUTPATIENT
Start: 2020-11-16 | End: 2020-11-20

## 2020-11-16 RX ORDER — CLONAZEPAM 1 MG
0.5 TABLET ORAL ONCE
Refills: 0 | Status: DISCONTINUED | OUTPATIENT
Start: 2020-11-16 | End: 2020-11-16

## 2020-11-16 RX ORDER — CARIPRAZINE 1.5 MG/1
1.5 CAPSULE, GELATIN COATED ORAL ONCE
Refills: 0 | Status: COMPLETED | OUTPATIENT
Start: 2020-11-16 | End: 2020-11-16

## 2020-11-16 RX ADMIN — CARIPRAZINE 1.5 MILLIGRAM(S): 1.5 CAPSULE, GELATIN COATED ORAL at 13:00

## 2020-11-16 RX ADMIN — RISPERIDONE 2 MILLIGRAM(S): 4 TABLET ORAL at 13:00

## 2020-11-16 RX ADMIN — RISPERIDONE 2 MILLIGRAM(S): 4 TABLET ORAL at 20:28

## 2020-11-16 RX ADMIN — Medication 200 MILLIGRAM(S): at 13:00

## 2020-11-16 RX ADMIN — GABAPENTIN 100 MILLIGRAM(S): 400 CAPSULE ORAL at 13:00

## 2020-11-16 RX ADMIN — GABAPENTIN 200 MILLIGRAM(S): 400 CAPSULE ORAL at 16:12

## 2020-11-16 RX ADMIN — GABAPENTIN 100 MILLIGRAM(S): 400 CAPSULE ORAL at 20:28

## 2020-11-16 RX ADMIN — Medication 0.5 MILLIGRAM(S): at 11:19

## 2020-11-16 RX ADMIN — GABAPENTIN 100 MILLIGRAM(S): 400 CAPSULE ORAL at 08:41

## 2020-11-16 RX ADMIN — Medication 1 MILLIGRAM(S): at 20:28

## 2020-11-17 PROCEDURE — 99232 SBSQ HOSP IP/OBS MODERATE 35: CPT

## 2020-11-17 RX ORDER — GABAPENTIN 400 MG/1
200 CAPSULE ORAL
Refills: 0 | Status: DISCONTINUED | OUTPATIENT
Start: 2020-11-17 | End: 2020-11-27

## 2020-11-17 RX ORDER — GABAPENTIN 400 MG/1
200 CAPSULE ORAL ONCE
Refills: 0 | Status: COMPLETED | OUTPATIENT
Start: 2020-11-17 | End: 2020-11-17

## 2020-11-17 RX ORDER — GABAPENTIN 400 MG/1
200 CAPSULE ORAL THREE TIMES A DAY
Refills: 0 | Status: DISCONTINUED | OUTPATIENT
Start: 2020-11-17 | End: 2020-11-18

## 2020-11-17 RX ADMIN — Medication 1 MILLIGRAM(S): at 09:14

## 2020-11-17 RX ADMIN — RISPERIDONE 2 MILLIGRAM(S): 4 TABLET ORAL at 22:11

## 2020-11-17 RX ADMIN — GABAPENTIN 200 MILLIGRAM(S): 400 CAPSULE ORAL at 22:11

## 2020-11-17 RX ADMIN — RISPERIDONE 2 MILLIGRAM(S): 4 TABLET ORAL at 12:46

## 2020-11-17 RX ADMIN — Medication 200 MILLIGRAM(S): at 09:14

## 2020-11-17 RX ADMIN — Medication 1 MILLIGRAM(S): at 22:11

## 2020-11-17 RX ADMIN — Medication 2 MILLIGRAM(S): at 12:53

## 2020-11-17 RX ADMIN — GABAPENTIN 200 MILLIGRAM(S): 400 CAPSULE ORAL at 10:24

## 2020-11-17 RX ADMIN — GABAPENTIN 100 MILLIGRAM(S): 400 CAPSULE ORAL at 09:14

## 2020-11-17 RX ADMIN — GABAPENTIN 100 MILLIGRAM(S): 400 CAPSULE ORAL at 12:46

## 2020-11-17 RX ADMIN — CARIPRAZINE 1.5 MILLIGRAM(S): 1.5 CAPSULE, GELATIN COATED ORAL at 09:14

## 2020-11-18 PROCEDURE — 90837 PSYTX W PT 60 MINUTES: CPT

## 2020-11-18 PROCEDURE — 99232 SBSQ HOSP IP/OBS MODERATE 35: CPT

## 2020-11-18 RX ORDER — CLONAZEPAM 1 MG
0.5 TABLET ORAL DAILY
Refills: 0 | Status: DISCONTINUED | OUTPATIENT
Start: 2020-11-18 | End: 2020-11-23

## 2020-11-18 RX ORDER — GABAPENTIN 400 MG/1
300 CAPSULE ORAL THREE TIMES A DAY
Refills: 0 | Status: DISCONTINUED | OUTPATIENT
Start: 2020-11-18 | End: 2020-11-27

## 2020-11-18 RX ORDER — CLONAZEPAM 1 MG
1 TABLET ORAL AT BEDTIME
Refills: 0 | Status: DISCONTINUED | OUTPATIENT
Start: 2020-11-18 | End: 2020-11-23

## 2020-11-18 RX ADMIN — RISPERIDONE 2 MILLIGRAM(S): 4 TABLET ORAL at 13:21

## 2020-11-18 RX ADMIN — GABAPENTIN 300 MILLIGRAM(S): 400 CAPSULE ORAL at 21:41

## 2020-11-18 RX ADMIN — Medication 200 MILLIGRAM(S): at 08:52

## 2020-11-18 RX ADMIN — Medication 0.5 MILLIGRAM(S): at 08:52

## 2020-11-18 RX ADMIN — RISPERIDONE 2 MILLIGRAM(S): 4 TABLET ORAL at 21:41

## 2020-11-18 RX ADMIN — HALOPERIDOL DECANOATE 5 MILLIGRAM(S): 100 INJECTION INTRAMUSCULAR at 14:28

## 2020-11-18 RX ADMIN — GABAPENTIN 200 MILLIGRAM(S): 400 CAPSULE ORAL at 09:30

## 2020-11-18 RX ADMIN — CARIPRAZINE 1.5 MILLIGRAM(S): 1.5 CAPSULE, GELATIN COATED ORAL at 08:52

## 2020-11-18 RX ADMIN — Medication 1 MILLIGRAM(S): at 21:41

## 2020-11-18 RX ADMIN — GABAPENTIN 200 MILLIGRAM(S): 400 CAPSULE ORAL at 08:52

## 2020-11-18 RX ADMIN — GABAPENTIN 200 MILLIGRAM(S): 400 CAPSULE ORAL at 13:21

## 2020-11-19 PROCEDURE — 90853 GROUP PSYCHOTHERAPY: CPT

## 2020-11-19 PROCEDURE — 99232 SBSQ HOSP IP/OBS MODERATE 35: CPT

## 2020-11-19 RX ORDER — HALOPERIDOL DECANOATE 100 MG/ML
5 INJECTION INTRAMUSCULAR ONCE
Refills: 0 | Status: DISCONTINUED | OUTPATIENT
Start: 2020-11-19 | End: 2020-11-27

## 2020-11-19 RX ORDER — DIPHENHYDRAMINE HCL 50 MG
50 CAPSULE ORAL ONCE
Refills: 0 | Status: DISCONTINUED | OUTPATIENT
Start: 2020-11-19 | End: 2020-11-27

## 2020-11-19 RX ADMIN — Medication 1 MILLIGRAM(S): at 21:06

## 2020-11-19 RX ADMIN — GABAPENTIN 300 MILLIGRAM(S): 400 CAPSULE ORAL at 09:07

## 2020-11-19 RX ADMIN — Medication 200 MILLIGRAM(S): at 09:07

## 2020-11-19 RX ADMIN — GABAPENTIN 300 MILLIGRAM(S): 400 CAPSULE ORAL at 21:06

## 2020-11-19 RX ADMIN — RISPERIDONE 2 MILLIGRAM(S): 4 TABLET ORAL at 21:06

## 2020-11-19 RX ADMIN — GABAPENTIN 300 MILLIGRAM(S): 400 CAPSULE ORAL at 13:44

## 2020-11-19 RX ADMIN — RISPERIDONE 2 MILLIGRAM(S): 4 TABLET ORAL at 13:44

## 2020-11-19 RX ADMIN — CARIPRAZINE 1.5 MILLIGRAM(S): 1.5 CAPSULE, GELATIN COATED ORAL at 09:07

## 2020-11-19 RX ADMIN — Medication 0.5 MILLIGRAM(S): at 09:07

## 2020-11-20 PROCEDURE — 99232 SBSQ HOSP IP/OBS MODERATE 35: CPT

## 2020-11-20 RX ORDER — RISPERIDONE 4 MG/1
2 TABLET ORAL AT BEDTIME
Refills: 0 | Status: DISCONTINUED | OUTPATIENT
Start: 2020-11-20 | End: 2020-11-27

## 2020-11-20 RX ORDER — RISPERIDONE 4 MG/1
1 TABLET ORAL
Refills: 0 | Status: DISCONTINUED | OUTPATIENT
Start: 2020-11-20 | End: 2020-11-27

## 2020-11-20 RX ORDER — CARIPRAZINE 1.5 MG/1
3 CAPSULE, GELATIN COATED ORAL DAILY
Refills: 0 | Status: DISCONTINUED | OUTPATIENT
Start: 2020-11-21 | End: 2020-11-27

## 2020-11-20 RX ADMIN — CARIPRAZINE 1.5 MILLIGRAM(S): 1.5 CAPSULE, GELATIN COATED ORAL at 08:20

## 2020-11-20 RX ADMIN — Medication 0.5 MILLIGRAM(S): at 08:20

## 2020-11-20 RX ADMIN — Medication 200 MILLIGRAM(S): at 08:20

## 2020-11-20 RX ADMIN — GABAPENTIN 300 MILLIGRAM(S): 400 CAPSULE ORAL at 21:27

## 2020-11-20 RX ADMIN — GABAPENTIN 300 MILLIGRAM(S): 400 CAPSULE ORAL at 08:20

## 2020-11-20 RX ADMIN — RISPERIDONE 2 MILLIGRAM(S): 4 TABLET ORAL at 21:27

## 2020-11-20 RX ADMIN — Medication 1 MILLIGRAM(S): at 21:27

## 2020-11-21 PROCEDURE — 99232 SBSQ HOSP IP/OBS MODERATE 35: CPT

## 2020-11-21 RX ADMIN — Medication 200 MILLIGRAM(S): at 08:50

## 2020-11-21 RX ADMIN — GABAPENTIN 300 MILLIGRAM(S): 400 CAPSULE ORAL at 21:29

## 2020-11-21 RX ADMIN — RISPERIDONE 2 MILLIGRAM(S): 4 TABLET ORAL at 21:29

## 2020-11-21 RX ADMIN — Medication 1 MILLIGRAM(S): at 21:29

## 2020-11-21 RX ADMIN — RISPERIDONE 1 MILLIGRAM(S): 4 TABLET ORAL at 12:59

## 2020-11-21 RX ADMIN — GABAPENTIN 300 MILLIGRAM(S): 400 CAPSULE ORAL at 08:50

## 2020-11-21 RX ADMIN — CARIPRAZINE 3 MILLIGRAM(S): 1.5 CAPSULE, GELATIN COATED ORAL at 08:50

## 2020-11-21 RX ADMIN — Medication 0.5 MILLIGRAM(S): at 08:50

## 2020-11-21 RX ADMIN — GABAPENTIN 300 MILLIGRAM(S): 400 CAPSULE ORAL at 12:59

## 2020-11-22 PROCEDURE — 99232 SBSQ HOSP IP/OBS MODERATE 35: CPT

## 2020-11-22 RX ADMIN — CARIPRAZINE 3 MILLIGRAM(S): 1.5 CAPSULE, GELATIN COATED ORAL at 08:50

## 2020-11-22 RX ADMIN — RISPERIDONE 2 MILLIGRAM(S): 4 TABLET ORAL at 20:58

## 2020-11-22 RX ADMIN — GABAPENTIN 300 MILLIGRAM(S): 400 CAPSULE ORAL at 20:58

## 2020-11-22 RX ADMIN — Medication 1 MILLIGRAM(S): at 20:58

## 2020-11-22 RX ADMIN — GABAPENTIN 300 MILLIGRAM(S): 400 CAPSULE ORAL at 08:50

## 2020-11-22 RX ADMIN — Medication 0.5 MILLIGRAM(S): at 08:50

## 2020-11-22 RX ADMIN — Medication 200 MILLIGRAM(S): at 08:50

## 2020-11-22 RX ADMIN — RISPERIDONE 1 MILLIGRAM(S): 4 TABLET ORAL at 12:40

## 2020-11-22 RX ADMIN — GABAPENTIN 300 MILLIGRAM(S): 400 CAPSULE ORAL at 12:40

## 2020-11-23 PROCEDURE — 99232 SBSQ HOSP IP/OBS MODERATE 35: CPT

## 2020-11-23 PROCEDURE — 90837 PSYTX W PT 60 MINUTES: CPT

## 2020-11-23 RX ORDER — CLONAZEPAM 1 MG
0.5 TABLET ORAL
Refills: 0 | Status: DISCONTINUED | OUTPATIENT
Start: 2020-11-23 | End: 2020-11-25

## 2020-11-23 RX ADMIN — Medication 0.5 MILLIGRAM(S): at 09:11

## 2020-11-23 RX ADMIN — RISPERIDONE 2 MILLIGRAM(S): 4 TABLET ORAL at 20:44

## 2020-11-23 RX ADMIN — GABAPENTIN 200 MILLIGRAM(S): 400 CAPSULE ORAL at 18:04

## 2020-11-23 RX ADMIN — RISPERIDONE 1 MILLIGRAM(S): 4 TABLET ORAL at 15:55

## 2020-11-23 RX ADMIN — GABAPENTIN 300 MILLIGRAM(S): 400 CAPSULE ORAL at 09:11

## 2020-11-23 RX ADMIN — GABAPENTIN 300 MILLIGRAM(S): 400 CAPSULE ORAL at 20:44

## 2020-11-23 RX ADMIN — GABAPENTIN 300 MILLIGRAM(S): 400 CAPSULE ORAL at 15:55

## 2020-11-23 RX ADMIN — CARIPRAZINE 3 MILLIGRAM(S): 1.5 CAPSULE, GELATIN COATED ORAL at 09:11

## 2020-11-23 RX ADMIN — GABAPENTIN 200 MILLIGRAM(S): 400 CAPSULE ORAL at 11:14

## 2020-11-23 RX ADMIN — Medication 200 MILLIGRAM(S): at 09:11

## 2020-11-23 RX ADMIN — Medication 0.5 MILLIGRAM(S): at 20:44

## 2020-11-23 RX ADMIN — HALOPERIDOL DECANOATE 5 MILLIGRAM(S): 100 INJECTION INTRAMUSCULAR at 11:15

## 2020-11-24 PROCEDURE — 73130 X-RAY EXAM OF HAND: CPT | Mod: 26,RT

## 2020-11-24 PROCEDURE — 99232 SBSQ HOSP IP/OBS MODERATE 35: CPT

## 2020-11-24 RX ADMIN — GABAPENTIN 200 MILLIGRAM(S): 400 CAPSULE ORAL at 15:50

## 2020-11-24 RX ADMIN — GABAPENTIN 300 MILLIGRAM(S): 400 CAPSULE ORAL at 13:37

## 2020-11-24 RX ADMIN — GABAPENTIN 300 MILLIGRAM(S): 400 CAPSULE ORAL at 08:50

## 2020-11-24 RX ADMIN — Medication 650 MILLIGRAM(S): at 09:10

## 2020-11-24 RX ADMIN — Medication 200 MILLIGRAM(S): at 08:50

## 2020-11-24 RX ADMIN — CARIPRAZINE 3 MILLIGRAM(S): 1.5 CAPSULE, GELATIN COATED ORAL at 08:50

## 2020-11-24 RX ADMIN — RISPERIDONE 2 MILLIGRAM(S): 4 TABLET ORAL at 18:40

## 2020-11-24 RX ADMIN — Medication 0.5 MILLIGRAM(S): at 18:40

## 2020-11-24 RX ADMIN — Medication 0.5 MILLIGRAM(S): at 08:50

## 2020-11-24 RX ADMIN — HALOPERIDOL DECANOATE 5 MILLIGRAM(S): 100 INJECTION INTRAMUSCULAR at 15:51

## 2020-11-24 RX ADMIN — GABAPENTIN 300 MILLIGRAM(S): 400 CAPSULE ORAL at 18:40

## 2020-11-24 RX ADMIN — Medication 650 MILLIGRAM(S): at 14:42

## 2020-11-24 RX ADMIN — RISPERIDONE 1 MILLIGRAM(S): 4 TABLET ORAL at 13:37

## 2020-11-25 PROCEDURE — 99232 SBSQ HOSP IP/OBS MODERATE 35: CPT

## 2020-11-25 RX ORDER — TOPIRAMATE 25 MG
1 TABLET ORAL
Qty: 30 | Refills: 0
Start: 2020-11-25 | End: 2020-12-24

## 2020-11-25 RX ORDER — RISPERIDONE 4 MG/1
1 TABLET ORAL
Qty: 90 | Refills: 0
Start: 2020-11-25

## 2020-11-25 RX ORDER — CARIPRAZINE 1.5 MG/1
1 CAPSULE, GELATIN COATED ORAL
Qty: 30 | Refills: 0
Start: 2020-11-25 | End: 2020-12-24

## 2020-11-25 RX ORDER — ALBUTEROL 90 UG/1
2 AEROSOL, METERED ORAL
Qty: 0 | Refills: 0 | DISCHARGE
Start: 2020-11-25

## 2020-11-25 RX ORDER — CLONAZEPAM 1 MG
1 TABLET ORAL
Qty: 60 | Refills: 0
Start: 2020-11-25 | End: 2020-12-24

## 2020-11-25 RX ORDER — GABAPENTIN 400 MG/1
1 CAPSULE ORAL
Qty: 90 | Refills: 0
Start: 2020-11-25 | End: 2020-12-24

## 2020-11-25 RX ORDER — CLONAZEPAM 1 MG
0.5 TABLET ORAL
Refills: 0 | Status: DISCONTINUED | OUTPATIENT
Start: 2020-11-25 | End: 2020-11-27

## 2020-11-25 RX ORDER — CARIPRAZINE 1.5 MG/1
2 CAPSULE, GELATIN COATED ORAL
Qty: 60 | Refills: 0
Start: 2020-11-25 | End: 2020-12-24

## 2020-11-25 RX ADMIN — CARIPRAZINE 3 MILLIGRAM(S): 1.5 CAPSULE, GELATIN COATED ORAL at 09:02

## 2020-11-25 RX ADMIN — RISPERIDONE 2 MILLIGRAM(S): 4 TABLET ORAL at 20:49

## 2020-11-25 RX ADMIN — Medication 0.5 MILLIGRAM(S): at 20:49

## 2020-11-25 RX ADMIN — GABAPENTIN 300 MILLIGRAM(S): 400 CAPSULE ORAL at 09:02

## 2020-11-25 RX ADMIN — GABAPENTIN 300 MILLIGRAM(S): 400 CAPSULE ORAL at 20:49

## 2020-11-25 RX ADMIN — Medication 200 MILLIGRAM(S): at 09:02

## 2020-11-25 RX ADMIN — RISPERIDONE 1 MILLIGRAM(S): 4 TABLET ORAL at 13:50

## 2020-11-25 RX ADMIN — Medication 0.5 MILLIGRAM(S): at 09:02

## 2020-11-25 RX ADMIN — GABAPENTIN 300 MILLIGRAM(S): 400 CAPSULE ORAL at 13:50

## 2020-11-26 RX ADMIN — Medication 0.5 MILLIGRAM(S): at 21:01

## 2020-11-26 RX ADMIN — Medication 0.5 MILLIGRAM(S): at 09:05

## 2020-11-26 RX ADMIN — RISPERIDONE 1 MILLIGRAM(S): 4 TABLET ORAL at 12:52

## 2020-11-26 RX ADMIN — GABAPENTIN 300 MILLIGRAM(S): 400 CAPSULE ORAL at 12:52

## 2020-11-26 RX ADMIN — Medication 200 MILLIGRAM(S): at 09:15

## 2020-11-26 RX ADMIN — CARIPRAZINE 3 MILLIGRAM(S): 1.5 CAPSULE, GELATIN COATED ORAL at 09:15

## 2020-11-26 RX ADMIN — RISPERIDONE 2 MILLIGRAM(S): 4 TABLET ORAL at 21:01

## 2020-11-26 RX ADMIN — GABAPENTIN 300 MILLIGRAM(S): 400 CAPSULE ORAL at 21:01

## 2020-11-26 RX ADMIN — GABAPENTIN 300 MILLIGRAM(S): 400 CAPSULE ORAL at 09:05

## 2020-11-27 VITALS — TEMPERATURE: 97 F | HEART RATE: 102 BPM | SYSTOLIC BLOOD PRESSURE: 101 MMHG | DIASTOLIC BLOOD PRESSURE: 69 MMHG

## 2020-11-27 PROCEDURE — 99238 HOSP IP/OBS DSCHRG MGMT 30/<: CPT

## 2020-11-27 PROCEDURE — 90834 PSYTX W PT 45 MINUTES: CPT

## 2020-11-27 RX ADMIN — GABAPENTIN 300 MILLIGRAM(S): 400 CAPSULE ORAL at 08:45

## 2020-11-27 RX ADMIN — CARIPRAZINE 3 MILLIGRAM(S): 1.5 CAPSULE, GELATIN COATED ORAL at 08:45

## 2020-11-27 RX ADMIN — Medication 0.5 MILLIGRAM(S): at 08:45

## 2020-11-27 RX ADMIN — Medication 200 MILLIGRAM(S): at 08:45

## 2021-03-29 NOTE — ED BEHAVIORAL HEALTH ASSESSMENT NOTE - SUMMARY
CRD catheter removed for RV Patient is a 20yo single  F, living with her friend, not in caregiving role, with reported history of bipolar disorder (rapid cylcing), ADHD and depression with SI since 5yo, multiple psych hospitalizations (last one was a few years ago), two suicide attempts by OD on Lithium when she was 17yo and by suffocation at age 17, hx of cutting, denying hx of violence or legal issues, remote history of alcohol and benzodiazepine abuse, denying hx of withdrawal sx, PMHx of PCOS, who came in today with her mother for increasing depression and SI where she reports that this morning she tied a noose around her neck but did not pull it and brought herself to ER.    Patient's mother and patient's psychiatrist recommend inpatient hospitalization given severity of mood episode and difficulty stabilizing on meds.  Patient is agreeable.  Recommend voluntary admission for safety, stabilization and med management.  Psychiatrist also recommended considering discharge to [employer], as she thinks patient would do well with it.  Recommend keeping contact with outpatient psychiatrist, especially prior to Wednesday as [place]. Patient is a 24yo single  F, living with her parents, not in caregiving role, unemployed, with charted PPhx of borderline PD, multiple psych hospitalizations (just discharged from  5 days ago on 72 hr letter for self-aborted suicide attempt by hanging), two suicide attempts by OD on Lithium when she was 19yo and by suffocation at age 17, hx of cutting, denying hx of violence or legal issues, remote history of alcohol and benzodiazepine abuse, denying hx of withdrawal sx, PMHx of PCOS, who came in today with her mother for readmission to  for ECT in the context of an argument with her mother and being told she does not have a home to return to. On interview pt denies change in her mood since discharge from the hospital (labile at baseline), reports stable sleep, appetite, and energy level. She reports chronic passive suicidal ideation at baseline intensity but adamantly denies active SI/I/P or urges for SIB. She acknowledges that her action of briefly picking up the knife during the argument was manipulative in nature due to fear of being abandoned by her mother, and adamantly denies pointing the knife at her mother in a threatening way or having any intent or harm or kill her mother. She adamantly denies HI/I/P or aggressive urges at this time. She reports engagement in outpatient treatment and does not want to voluntarily come into the hospital. At this time pt's presentation appears to be consistent with her chronic baseline which includes characteristics of borderline personality d/o such as affective instability, difficulty with management of anger, impulsive behaviors, chronic passive suicidal ideation, and chaotic interpersonal relationships. These characteristics are chronic and longstanding in nature, and unlikely to be modified by an acute inpatient hospitalization. Patient has had multiple inpt hospitalizations which pt admits have not been helpful to her in the past. At this time pt will derive greater benefit from continued long term outpatient treatment addressing distress tolerance skill and behavioral modifications. At this time pt does not appear to be an imminent risk of harm and is stable for discharge. Patient is a 22yo single  F, living with her parents, not in caregiving role, unemployed, with charted PPhx of borderline PD, multiple psych hospitalizations (just discharged from  5 days ago on 72 hr letter for self-aborted suicide attempt by hanging), two suicide attempts by OD on Lithium when she was 19yo and by suffocation at age 17, hx of cutting, denying hx of violence or legal issues, remote history of alcohol and benzodiazepine abuse, denying hx of withdrawal sx, PMHx of PCOS, who came in today with her mother for readmission to  for ECT in the context of an argument with her mother and being told she does not have a home to return to. On interview pt denies change in her mood since discharge from the hospital (labile at baseline), reports stable sleep, appetite, and energy level. She reports chronic passive suicidal ideation at baseline intensity but adamantly denies active SI/I/P or urges for SIB. She acknowledges that her action of briefly picking up the knife during the argument was manipulative in nature due to fear of being abandoned by her mother, and adamantly denies pointing the knife at her mother in a threatening way or having any intent to harm or kill her mother. She adamantly denies HI/I/P or aggressive urges at this time. She reports engagement in outpatient treatment and does not want to voluntarily come into the hospital. At this time pt's presentation appears to be consistent with her chronic baseline which includes characteristics of borderline personality d/o such as affective instability, difficulty with management of anger, impulsive behaviors, chronic passive suicidal ideation, and chaotic interpersonal relationships. These characteristics are chronic and longstanding in nature, and unlikely to be modified by an acute inpatient hospitalization. Patient has had multiple inpt hospitalizations which pt admits have not been helpful to her in the past. At this time pt will derive greater benefit from continued long term outpatient treatment addressing distress tolerance skills and behavioral modifications. At this time pt does not appear to be an imminent risk of harm to self or others, and she does not meet criteria for involuntary psychiatric admission.

## 2021-06-22 NOTE — ED ADULT TRIAGE NOTE - CHIEF COMPLAINT QUOTE
-- Message is from the [facility]--    Called and left voicemail to schedule post hospital follow appointment.     Hospital:  [facility]   Discharge date:  6/20/21  Appointment needed with PCP by: 6/23/21     ACC AGENT: If patient calls back, follow Guided Workflow to schedule the PHF appointment.    Escalate to leadership if unable to schedule an appointment within that timeframe.   Pt states she is feeling depressed and stressed +SI with a plan to overdose, denies HI, Denies AH, TH, VH, Denies ETOH or drug use. PMH: Bipolar, borderline, depression and anxiety

## 2022-02-17 NOTE — ED PROVIDER NOTE - SKIN, MLM
Neurosurgery note:    X-rays reviewed, some worsening of T8 fracture - not unexpected given her poor bone quality. Continue brace when out of bed. Okay for activity as tolerated. Neurosurgery will sign off, follow up four weeks with x-rays. Please call with questions.    Clara Mitchell CNP  [facility] Neurosurgery  O: [phone]  P: [phone]     Skin normal color for race, warm, dry and intact. No evidence of rash.

## 2022-02-18 NOTE — ED ADULT NURSE NOTE - NS ED NURSE LEVEL OF CONSCIOUSNESS ORIENTATION
Name: Camila Deluna [facility]  : 1962  MRN: [id number]    Oncology Navigation Follow-Up Note    Contact Type:  Medical Oncology  Notes: Pt @ [facility] for Dr. Leeroy Darnell f/u & tx. Met with pt prior to f/u. Pt noted tearful & verbalized concerns r/t son's illness. Inquired on psychiatry appt. Pt stated awaits call back from  to confirm referral okay from oncologist & not PCP. Pt stated is not established w/PCP r/t cancer dx & active tx. [facility], pt agreeable, referral placed. Pt denied questions/concerns. Instructed pt may contact writer prn. Dr. Leeroy Darnell updated. Will continue to follow.     Electronically signed by Dar Barton RN on 2022 at 11:15 AM Oriented - self; Oriented - place; Oriented - time

## 2023-01-01 NOTE — ED ADULT TRIAGE NOTE - TEMPERATURE IN FAHRENHEIT (DEGREES F)
Orientation to room/Bed in low position, brakes on/Environment clear of unused equipment, furniture's in place, clear of hazards/Remove all unused equipment out of the room/Keep bed in the lowest position, unless patient is directly attended 98.3

## 2023-12-26 ENCOUNTER — OFFICE (OUTPATIENT)
Facility: LOCATION | Age: 28
Setting detail: OPHTHALMOLOGY
End: 2023-12-26

## 2023-12-26 DIAGNOSIS — H52.13: ICD-10-CM

## 2023-12-26 PROBLEM — H01.001 BLEPHARITIS; RIGHT UPPER LID, RIGHT LOWER LID, LEFT UPPER LID, LEFT LOWER LID: Status: ACTIVE | Noted: 2023-12-26

## 2023-12-26 PROBLEM — H43.393 VITREOUS FLOATERS; BOTH EYES: Status: ACTIVE | Noted: 2023-12-26

## 2023-12-26 PROBLEM — H01.004 BLEPHARITIS; RIGHT UPPER LID, RIGHT LOWER LID, LEFT UPPER LID, LEFT LOWER LID: Status: ACTIVE | Noted: 2023-12-26

## 2023-12-26 PROBLEM — H01.005 BLEPHARITIS; RIGHT UPPER LID, RIGHT LOWER LID, LEFT UPPER LID, LEFT LOWER LID: Status: ACTIVE | Noted: 2023-12-26

## 2023-12-26 PROBLEM — H01.002 BLEPHARITIS; RIGHT UPPER LID, RIGHT LOWER LID, LEFT UPPER LID, LEFT LOWER LID: Status: ACTIVE | Noted: 2023-12-26

## 2023-12-26 PROBLEM — H16.223 DRY EYE SYNDROME K SICCA; BOTH EYES: Status: ACTIVE | Noted: 2023-12-26

## 2023-12-26 PROCEDURE — 92015 DETERMINE REFRACTIVE STATE: CPT | Performed by: OPHTHALMOLOGY

## 2023-12-29 PROBLEM — D31.30 CHOROIDAL NEVUS: Status: ACTIVE | Noted: 2023-12-26

## 2023-12-29 PROBLEM — D31.31 CHOROIDAL NEVUS: Status: ACTIVE | Noted: 2023-12-26

## 2023-12-29 PROBLEM — D31.32 CHOROIDAL NEVUS: Status: ACTIVE | Noted: 2023-12-26

## 2024-05-03 ENCOUNTER — OFFICE (OUTPATIENT)
Facility: LOCATION | Age: 29
Setting detail: OPHTHALMOLOGY
End: 2024-05-03
Payer: MEDICARE

## 2024-05-03 DIAGNOSIS — H52.13: ICD-10-CM

## 2024-05-03 DIAGNOSIS — H01.002: ICD-10-CM

## 2024-05-03 DIAGNOSIS — H01.004: ICD-10-CM

## 2024-05-03 DIAGNOSIS — D31.31: ICD-10-CM

## 2024-05-03 DIAGNOSIS — H01.001: ICD-10-CM

## 2024-05-03 DIAGNOSIS — H01.005: ICD-10-CM

## 2024-05-03 DIAGNOSIS — H16.223: ICD-10-CM

## 2024-05-03 DIAGNOSIS — H43.393: ICD-10-CM

## 2024-05-03 PROCEDURE — 92250 FUNDUS PHOTOGRAPHY W/I&R: CPT | Performed by: OPHTHALMOLOGY

## 2024-05-03 PROCEDURE — 92014 COMPRE OPH EXAM EST PT 1/>: CPT | Performed by: OPHTHALMOLOGY

## 2024-05-03 PROCEDURE — 92015 DETERMINE REFRACTIVE STATE: CPT | Mod: GA | Performed by: OPHTHALMOLOGY

## 2024-05-03 ASSESSMENT — CONFRONTATIONAL VISUAL FIELD TEST (CVF)
OD_FINDINGS: FULL
OS_FINDINGS: FULL

## 2024-05-03 ASSESSMENT — LID EXAM ASSESSMENTS
OS_BLEPHARITIS: LLL LUL T
OD_BLEPHARITIS: RLL RUL T

## 2025-05-03 ENCOUNTER — OFFICE (OUTPATIENT)
Facility: LOCATION | Age: 30
Setting detail: OPHTHALMOLOGY
End: 2025-05-03
Payer: COMMERCIAL

## 2025-05-03 DIAGNOSIS — H10.433: ICD-10-CM

## 2025-05-03 DIAGNOSIS — H52.13: ICD-10-CM

## 2025-05-03 DIAGNOSIS — D31.31: ICD-10-CM

## 2025-05-03 DIAGNOSIS — H43.393: ICD-10-CM

## 2025-05-03 PROCEDURE — 92015 DETERMINE REFRACTIVE STATE: CPT | Performed by: OPHTHALMOLOGY

## 2025-05-03 PROCEDURE — 92014 COMPRE OPH EXAM EST PT 1/>: CPT | Performed by: OPHTHALMOLOGY

## 2025-05-03 PROCEDURE — 92250 FUNDUS PHOTOGRAPHY W/I&R: CPT | Performed by: OPHTHALMOLOGY

## 2025-05-03 ASSESSMENT — TONOMETRY
OS_IOP_MMHG: 18
OD_IOP_MMHG: 18

## 2025-05-03 ASSESSMENT — LID EXAM ASSESSMENTS
OS_BLEPHARITIS: LLL LUL T
OD_BLEPHARITIS: RLL RUL T

## 2025-05-03 ASSESSMENT — CONFRONTATIONAL VISUAL FIELD TEST (CVF)
OD_FINDINGS: FULL
OS_FINDINGS: FULL

## 2025-05-03 ASSESSMENT — SUPERFICIAL PUNCTATE KERATITIS (SPK)
OS_SPK: T
OD_SPK: T

## 2025-05-05 ASSESSMENT — REFRACTION_TRIALFRAME
OD_SPHERE: -1.50
OU_VA: 20/20
OD_VA1: 20/20
OS_SPHERE: -1.00
OS_CYLINDER: +0.50
OS_AXIS: 035
OS_VA1: 20/20
OD_CYLINDER: +0.50
OD_AXIS: 180

## 2025-05-05 ASSESSMENT — REFRACTION_CURRENTRX
OS_AXIS: 035
OS_SPHERE: -1.25
OS_OVR_VA: 20/
OD_AXIS: 171
OD_SPHERE: -1.50
OS_CYLINDER: +0.50
OD_OVR_VA: 20/
OD_CYLINDER: +0.50

## 2025-05-05 ASSESSMENT — REFRACTION_AUTOREFRACTION
OD_SPHERE: -1.50
OD_CYLINDER: +0.75
OS_SPHERE: -1.25
OS_AXIS: 038
OS_CYLINDER: +0.50
OD_AXIS: 175

## 2025-05-05 ASSESSMENT — KERATOMETRY
OS_K2POWER_DIOPTERS: 45.75
OS_AXISANGLE_DEGREES: 092
OS_K1POWER_DIOPTERS: 45.25
OD_K1POWER_DIOPTERS: 45.50
OD_K2POWER_DIOPTERS: 45.50

## 2025-05-05 ASSESSMENT — VISUAL ACUITY
OS_BCVA: 20/20
OD_BCVA: 20/20

## 2025-05-05 ASSESSMENT — REFRACTION_MANIFEST
OS_CYLINDER: +0.50
OS_AXIS: 35
OD_VA1: 20/20
OU_VA: 20/20
OS_SPHERE: -0.75
OS_VA1: 20/20
OD_CYLINDER: +0.50
OD_AXIS: 015
OD_SPHERE: -1.00